# Patient Record
Sex: MALE | Race: WHITE | NOT HISPANIC OR LATINO | Employment: OTHER | URBAN - METROPOLITAN AREA
[De-identification: names, ages, dates, MRNs, and addresses within clinical notes are randomized per-mention and may not be internally consistent; named-entity substitution may affect disease eponyms.]

---

## 2017-01-10 RX ORDER — ATORVASTATIN CALCIUM 10 MG/1
TABLET, FILM COATED ORAL
Qty: 30 TABLET | Refills: 0 | Status: SHIPPED | OUTPATIENT
Start: 2017-01-10 | End: 2017-01-23 | Stop reason: DRUGHIGH

## 2017-01-16 DIAGNOSIS — E11.9 CONTROLLED TYPE 2 DIABETES MELLITUS WITHOUT COMPLICATION, WITHOUT LONG-TERM CURRENT USE OF INSULIN (HCC): ICD-10-CM

## 2017-01-16 DIAGNOSIS — E55.9 VITAMIN D DEFICIENCY: ICD-10-CM

## 2017-01-16 DIAGNOSIS — I10 HTN (HYPERTENSION), BENIGN: Primary | ICD-10-CM

## 2017-01-16 LAB
25(OH)D3+25(OH)D2 SERPL-MCNC: 27 NG/ML
ALBUMIN SERPL-MCNC: 4.1 G/DL (ref 3.5–5.2)
ALBUMIN/GLOB SERPL: 1.8 G/DL
ALP SERPL-CCNC: 131 U/L (ref 40–129)
ALT SERPL-CCNC: 22 U/L (ref 5–41)
AST SERPL-CCNC: 19 U/L (ref 5–40)
BASOPHILS # BLD AUTO: 0.09 10*3/MM3 (ref 0–0.2)
BASOPHILS NFR BLD AUTO: 1 % (ref 0–2)
BILIRUB SERPL-MCNC: 0.4 MG/DL (ref 0.2–1.2)
BUN SERPL-MCNC: 16 MG/DL (ref 8–23)
BUN/CREAT SERPL: 15.7 (ref 7–25)
CALCIUM SERPL-MCNC: 9.1 MG/DL (ref 8.8–10.5)
CHLORIDE SERPL-SCNC: 103 MMOL/L (ref 98–107)
CHOLEST SERPL-MCNC: 160 MG/DL (ref 0–200)
CO2 SERPL-SCNC: 28.8 MMOL/L (ref 22–29)
CREAT SERPL-MCNC: 1.02 MG/DL (ref 0.76–1.27)
EOSINOPHIL # BLD AUTO: 0.3 10*3/MM3 (ref 0.1–0.3)
EOSINOPHIL NFR BLD AUTO: 3.4 % (ref 0–4)
ERYTHROCYTE [DISTWIDTH] IN BLOOD BY AUTOMATED COUNT: 14.1 % (ref 11.5–14.5)
GLOBULIN SER CALC-MCNC: 2.3 GM/DL
GLUCOSE SERPL-MCNC: 105 MG/DL (ref 65–99)
HBA1C MFR BLD: 6.1 % (ref 4.8–5.6)
HCT VFR BLD AUTO: 43.6 % (ref 42–52)
HDLC SERPL-MCNC: 36 MG/DL (ref 40–60)
HGB BLD-MCNC: 13.7 G/DL (ref 14–18)
IMM GRANULOCYTES # BLD: 0.05 10*3/MM3 (ref 0–0.03)
IMM GRANULOCYTES NFR BLD: 0.6 % (ref 0–0.5)
LDLC SERPL CALC-MCNC: 102 MG/DL (ref 0–100)
LYMPHOCYTES # BLD AUTO: 2.87 10*3/MM3 (ref 0.6–4.8)
LYMPHOCYTES NFR BLD AUTO: 32.4 % (ref 20–45)
MCH RBC QN AUTO: 27.8 PG (ref 27–31)
MCHC RBC AUTO-ENTMCNC: 31.4 G/DL (ref 31–37)
MCV RBC AUTO: 88.4 FL (ref 80–94)
MONOCYTES # BLD AUTO: 0.72 10*3/MM3 (ref 0–1)
MONOCYTES NFR BLD AUTO: 8.1 % (ref 3–8)
NEUTROPHILS # BLD AUTO: 4.84 10*3/MM3 (ref 1.5–8.3)
NEUTROPHILS NFR BLD AUTO: 54.5 % (ref 45–70)
NRBC BLD AUTO-RTO: 0 /100 WBC (ref 0–0)
PLATELET # BLD AUTO: 284 10*3/MM3 (ref 140–500)
POTASSIUM SERPL-SCNC: 4.9 MMOL/L (ref 3.5–5.2)
PROT SERPL-MCNC: 6.4 G/DL (ref 6–8.5)
RBC # BLD AUTO: 4.93 10*6/MM3 (ref 4.7–6.1)
SODIUM SERPL-SCNC: 143 MMOL/L (ref 136–145)
TRIGL SERPL-MCNC: 112 MG/DL (ref 0–150)
VLDLC SERPL CALC-MCNC: 22.4 MG/DL (ref 8–32)
WBC # BLD AUTO: 8.87 10*3/MM3 (ref 4.8–10.8)

## 2017-01-23 ENCOUNTER — OFFICE VISIT (OUTPATIENT)
Dept: FAMILY MEDICINE CLINIC | Facility: CLINIC | Age: 67
End: 2017-01-23

## 2017-01-23 VITALS
HEIGHT: 69 IN | WEIGHT: 278 LBS | TEMPERATURE: 98.2 F | BODY MASS INDEX: 41.18 KG/M2 | HEART RATE: 100 BPM | DIASTOLIC BLOOD PRESSURE: 80 MMHG | SYSTOLIC BLOOD PRESSURE: 130 MMHG | OXYGEN SATURATION: 95 %

## 2017-01-23 DIAGNOSIS — E11.9 CONTROLLED TYPE 2 DIABETES MELLITUS WITHOUT COMPLICATION, WITHOUT LONG-TERM CURRENT USE OF INSULIN (HCC): ICD-10-CM

## 2017-01-23 DIAGNOSIS — E55.9 VITAMIN D DEFICIENCY: ICD-10-CM

## 2017-01-23 DIAGNOSIS — E78.5 HYPERLIPIDEMIA, UNSPECIFIED HYPERLIPIDEMIA TYPE: ICD-10-CM

## 2017-01-23 DIAGNOSIS — I10 HTN (HYPERTENSION), BENIGN: Primary | ICD-10-CM

## 2017-01-23 DIAGNOSIS — E66.01 MORBID EXOGENOUS OBESITY (HCC): ICD-10-CM

## 2017-01-23 DIAGNOSIS — Z00.00 HEALTH CARE MAINTENANCE: ICD-10-CM

## 2017-01-23 PROCEDURE — 99213 OFFICE O/P EST LOW 20 MIN: CPT | Performed by: FAMILY MEDICINE

## 2017-01-23 RX ORDER — ATORVASTATIN CALCIUM 20 MG/1
TABLET, FILM COATED ORAL
Refills: 3 | COMMUNITY
Start: 2017-01-19

## 2017-01-23 RX ORDER — FLUOXETINE HYDROCHLORIDE 40 MG/1
40 CAPSULE ORAL DAILY
Qty: 90 CAPSULE | Refills: 1 | Status: SHIPPED | OUTPATIENT
Start: 2017-01-23 | End: 2017-07-05 | Stop reason: SDUPTHER

## 2017-01-23 RX ORDER — BUPROPION HYDROCHLORIDE 300 MG/1
300 TABLET ORAL EVERY MORNING
Qty: 90 TABLET | Refills: 1 | Status: SHIPPED | OUTPATIENT
Start: 2017-01-23 | End: 2017-07-05 | Stop reason: SDUPTHER

## 2017-01-23 RX ORDER — PANTOPRAZOLE SODIUM 40 MG/1
40 TABLET, DELAYED RELEASE ORAL DAILY
Qty: 90 TABLET | Refills: 1 | Status: SHIPPED | OUTPATIENT
Start: 2017-01-23 | End: 2017-09-17 | Stop reason: SDUPTHER

## 2017-01-23 RX ORDER — GABAPENTIN 400 MG/1
400 CAPSULE ORAL 3 TIMES DAILY
COMMUNITY

## 2017-01-23 RX ORDER — ARIPIPRAZOLE 20 MG/1
20 TABLET ORAL DAILY
Qty: 90 TABLET | Refills: 1 | Status: SHIPPED | OUTPATIENT
Start: 2017-01-23 | End: 2017-07-05 | Stop reason: SDUPTHER

## 2017-01-23 NOTE — MR AVS SNAPSHOT
Simba Mosher   1/23/2017 9:00 AM   Office Visit    Provider:  Jose Maria Molina DO   Department:  [facility]   Dept Phone:  [phone]                Your Full Care Plan              Today's Medication Changes          These changes are accurate as of: 1/23/17  9:34 AM.  If you have any questions, ask your nurse or doctor.               Medication(s)that have changed:     atorvastatin 20 MG tablet   Commonly known as:  LIPITOR   TK 1 T PO D   What changed:  Another medication with the same name was removed. Continue taking this medication, and follow the directions you see here.       gabapentin 400 MG capsule   Commonly known as:  NEURONTIN   Take 400 mg by mouth 3 (Three) Times a Day.   What changed:  Another medication with the same name was removed. Continue taking this medication, and follow the directions you see here.            Where to Get Your Medications      These medications were sent to [facility] [address] [place] [address] AT [address] of [address] [facility] [phone] [place] [phone] [address] [address]     Phone:  [phone]     ARIPiprazole 20 MG tablet    buPROPion  MG 24 hr tablet    FLUoxetine 40 MG capsule    metFORMIN 500 MG tablet    pantoprazole 40 MG EC tablet                  Your Updated Medication List          This list is accurate as of: 1/23/17  9:34 AM.  Always use your most recent med list.                ARIPiprazole 20 MG tablet   Commonly known as:  ABILIFY   Take 1 tablet by mouth Daily.       atorvastatin 20 MG tablet   Commonly known as:  LIPITOR       buPROPion  MG 24 hr tablet   Commonly known as:  WELLBUTRIN XL   Take 1 tablet by mouth Every Morning.       FLUoxetine 40 MG capsule   Commonly known as:  PROzac   Take 1 capsule by mouth Daily.       gabapentin 400 MG capsule   Commonly known as:  NEURONTIN       gentamicin 0.3 % ophthalmic  ointment   Commonly known as:  GENTAK   Administer  into the left eye 3 (three) times a day.       glucose blood test strip   Test once daily       HYDROcodone-acetaminophen 7.5-325 MG per tablet   Commonly known as:  NORCO       ketoconazole 2 % cream   Commonly known as:  NIZORAL   Apply  topically daily.       lisinopril 40 MG tablet   Commonly known as:  PRINIVIL,ZESTRIL   TAKE 1 TABLET BY MOUTH EVERY DAY       metFORMIN 500 MG tablet   Commonly known as:  GLUCOPHAGE   Take 1 tablet by mouth 2 (Two) Times a Day With Meals.       pantoprazole 40 MG EC tablet   Commonly known as:  PROTONIX   Take 1 tablet by mouth Daily.       Vitamin D 2000 UNITS tablet               You Were Diagnosed With        Codes Comments    HTN (hypertension), benign    -  Primary ICD-10-CM: I10  ICD-9-CM: 401.1     Vitamin D deficiency     ICD-10-CM: E55.9  ICD-9-CM: 268.9     Controlled type 2 diabetes mellitus without complication, without long-term current use of insulin     ICD-10-CM: E11.9  ICD-9-CM: 250.00     Hyperlipidemia, unspecified hyperlipidemia type     ICD-10-CM: E78.5  ICD-9-CM: 272.4     Health care maintenance     ICD-10-CM: Z00.00  ICD-9-CM: V70.0       Instructions    Avoid cholesterol and sugar in diet.  Start to lower weight with restricting calories and exercising.       Patient Instructions History      [employer] Signup     Our records indicate that you have an active [employer] account.    You can view your After Visit Summary by going to [email or web address] [place] and logging in with your [employer] username and password.  If you don't have a [employer] username and password but a parent or guardian has access to your record, the parent or guardian should login with their own [employer] username and password and access your record to view the After Visit Summary.    If you have questions, you can email [email or web address] or call [phone] to talk to our [employer] staff.  Remember, [employer] is NOT to be  "used for urgent needs.  For medical emergencies, dial 911.               Other Info from Your Visit           Your Appointments     Jan 31, 2017  9:00 AM EST   Consult Sleep Clinic with [facility]EP [facility] PROVIDER SCHEDULE   [facility] ([place])    [address] [place] [address]  [place] [address]   [phone]            Jun 28, 2017  9:00 AM EDT   [facility] with [facility]   [facility] (--)    [address] [facility] [address]   [phone]            Jul 05, 2017  9:00 AM EDT   Follow Up with Jose Maria Molina DO   [facility] (--)    [address] [facility] [address]   [phone]           Arrive 15 minutes prior to appointment.              Allergies     Iodine      Shellfish-derived Products        Reason for Visit     Hypertension lab follow up      Vital Signs     Blood Pressure Pulse Temperature Height Weight Oxygen Saturation    130/80 100 98.2 °F (36.8 °C) 69\" (175.3 cm) 278 lb (126 kg) 95%    Body Mass Index Smoking Status                41.05 kg/m2 Former Smoker          Problems and Diagnoses Noted     HTN (hypertension), benign    Type 2 diabetes mellitus, controlled    Vitamin D deficiency    High cholesterol or triglycerides        Health maintenance examination          "

## 2017-01-23 NOTE — PATIENT INSTRUCTIONS
Avoid cholesterol and sugar in diet.  Start to lower weight with restricting calories and exercising.

## 2017-01-24 PROBLEM — E78.5 HYPERLIPIDEMIA: Status: ACTIVE | Noted: 2017-01-24

## 2017-01-24 PROBLEM — E66.01 MORBID EXOGENOUS OBESITY (HCC): Status: ACTIVE | Noted: 2017-01-24

## 2017-01-31 ENCOUNTER — HOSPITAL ENCOUNTER (OUTPATIENT)
Dept: SLEEP MEDICINE | Facility: HOSPITAL | Age: 67
Discharge: HOME | End: 2017-01-31
Admitting: INTERNAL MEDICINE

## 2017-01-31 DIAGNOSIS — G47.33 OBSTRUCTIVE SLEEP APNEA: ICD-10-CM

## 2017-01-31 DIAGNOSIS — G47.33 OSA (OBSTRUCTIVE SLEEP APNEA): Primary | ICD-10-CM

## 2017-01-31 PROCEDURE — G0463 HOSPITAL OUTPT CLINIC VISIT: HCPCS

## 2017-02-02 ENCOUNTER — TELEPHONE (OUTPATIENT)
Dept: FAMILY MEDICINE CLINIC | Facility: CLINIC | Age: 67
End: 2017-02-02

## 2017-02-02 RX ORDER — AZITHROMYCIN 250 MG/1
TABLET, FILM COATED ORAL
Qty: 6 TABLET | Refills: 0 | Status: SHIPPED | OUTPATIENT
Start: 2017-02-02 | End: 2017-02-27

## 2017-02-02 NOTE — TELEPHONE ENCOUNTER
Pt calling with productive cough, nasal drainage,and burning in the nose.  Wants rx unless you need to see him.

## 2017-02-03 NOTE — TELEPHONE ENCOUNTER
Patient has been notified of medication.  He states he saw his sleep study  On Monday, and he has suggested that he have a lung study because of his COPD.

## 2017-02-06 DIAGNOSIS — J42 CHRONIC BRONCHITIS, UNSPECIFIED CHRONIC BRONCHITIS TYPE (HCC): Primary | ICD-10-CM

## 2017-02-14 ENCOUNTER — HOSPITAL ENCOUNTER (OUTPATIENT)
Dept: SLEEP MEDICINE | Facility: HOSPITAL | Age: 67
Discharge: HOME | End: 2017-02-14
Admitting: INTERNAL MEDICINE

## 2017-02-14 DIAGNOSIS — G47.33 OSA (OBSTRUCTIVE SLEEP APNEA): ICD-10-CM

## 2017-02-14 PROCEDURE — 95811 POLYSOM 6/>YRS CPAP 4/> PARM: CPT

## 2017-02-15 ENCOUNTER — HOSPITAL ENCOUNTER (OUTPATIENT)
Dept: PULMONOLOGY | Facility: HOSPITAL | Age: 67
Discharge: HOME | End: 2017-02-15
Admitting: FAMILY MEDICINE

## 2017-02-15 VITALS — OXYGEN SATURATION: 98 % | HEART RATE: 87 BPM

## 2017-02-15 DIAGNOSIS — J42 CHRONIC BRONCHITIS, UNSPECIFIED CHRONIC BRONCHITIS TYPE (HCC): ICD-10-CM

## 2017-02-15 PROCEDURE — 94729 DIFFUSING CAPACITY: CPT

## 2017-02-15 PROCEDURE — 63710000001 ALBUTEROL PER 1 MG: Performed by: FAMILY MEDICINE

## 2017-02-15 PROCEDURE — 94726 PLETHYSMOGRAPHY LUNG VOLUMES: CPT

## 2017-02-15 PROCEDURE — 94060 EVALUATION OF WHEEZING: CPT

## 2017-02-15 PROCEDURE — A9270 NON-COVERED ITEM OR SERVICE: HCPCS | Performed by: FAMILY MEDICINE

## 2017-02-15 RX ORDER — ALBUTEROL SULFATE 2.5 MG/3ML
2.5 SOLUTION RESPIRATORY (INHALATION) ONCE
Status: COMPLETED | OUTPATIENT
Start: 2017-02-15 | End: 2017-02-15

## 2017-02-15 RX ADMIN — ALBUTEROL SULFATE 2.5 MG: 2.5 SOLUTION RESPIRATORY (INHALATION) at 08:27

## 2017-02-24 RX ORDER — LISINOPRIL 40 MG/1
TABLET ORAL
Qty: 90 TABLET | Refills: 0 | Status: SHIPPED | OUTPATIENT
Start: 2017-02-24 | End: 2017-05-13 | Stop reason: SDUPTHER

## 2017-02-27 ENCOUNTER — OFFICE VISIT (OUTPATIENT)
Dept: FAMILY MEDICINE CLINIC | Facility: CLINIC | Age: 67
End: 2017-02-27

## 2017-02-27 VITALS
RESPIRATION RATE: 22 BRPM | HEART RATE: 86 BPM | TEMPERATURE: 97.7 F | DIASTOLIC BLOOD PRESSURE: 74 MMHG | OXYGEN SATURATION: 97 % | HEIGHT: 69 IN | WEIGHT: 289 LBS | SYSTOLIC BLOOD PRESSURE: 142 MMHG | BODY MASS INDEX: 42.8 KG/M2

## 2017-02-27 DIAGNOSIS — N45.1 EPIDIDYMITIS: Primary | ICD-10-CM

## 2017-02-27 PROCEDURE — 99214 OFFICE O/P EST MOD 30 MIN: CPT | Performed by: NURSE PRACTITIONER

## 2017-02-27 RX ORDER — LEVOFLOXACIN 500 MG/1
500 TABLET, FILM COATED ORAL DAILY
Qty: 10 TABLET | Refills: 0 | Status: SHIPPED | OUTPATIENT
Start: 2017-02-27 | End: 2017-03-09

## 2017-02-27 NOTE — PROGRESS NOTES
Subjective   Simba Mosher is a 66 y.o. male.   Chief Complaint   Patient presents with   • Hernia     x 5-6 days, right testicle, feels alot of pull. pt stataes it cause alot of pain when pt has to get on knees, pulling down.     Vitals:    02/27/17 0904   BP: 142/74   Pulse: 86   Resp: 22   Temp: 97.7 °F (36.5 °C)   SpO2: 97%     Allergies   Allergen Reactions   • Iodine    • Shellfish-Derived Products      HPI Comments: Aching pain on right side.   Worse when standing and moving.   Radiates to lower back. Has had two hydroceles before.   Taking aleve at night.   Can feel a bulge.     Groin Pain   The patient's primary symptoms include scrotal swelling (right side) and testicular pain (right-achy). The patient's pertinent negatives include no genital injury, genital itching, penile discharge or penile pain. This is a new problem. The current episode started in the past 7 days (last tuesday). The problem has been gradually worsening. Associated symptoms include abdominal pain (lower/pelvic) and urgency. Pertinent negatives include no anorexia, chills, constipation, diarrhea, dysuria, fever, flank pain, frequency, headaches or nausea. Associated symptoms comments: Spastic bowel. . There is no reported injury. The testicular pain affects the right testicle. There is swelling in the right testicle. The color of the testicles is normal. The symptoms are aggravated by activity and tactile pressure (lifting grand daughter). No, his partner does not have an STD. His past medical history is significant for kidney stones. There is no history of BPH or cryptorchidism. (Hydrocele)        The following portions of the patient's history were reviewed and updated as appropriate: allergies, current medications, past family history, past medical history, past social history, past surgical history and problem list.    Review of Systems   Constitutional: Negative for chills and fever.   Gastrointestinal: Positive for abdominal  pain (lower/pelvic). Negative for anorexia, constipation, diarrhea and nausea.   Genitourinary: Positive for scrotal swelling (right side), testicular pain (right-achy) and urgency. Negative for difficulty urinating, discharge, dysuria, flank pain, frequency and penile pain.   Musculoskeletal: Positive for back pain (low back).   Neurological: Negative for headaches.   All other systems reviewed and are negative.      Objective   Physical Exam   Constitutional: He appears well-nourished.   HENT:   Head: Normocephalic.   Cardiovascular: Normal rate.    Pulmonary/Chest: Effort normal.   Abdominal: Hernia confirmed negative in the right inguinal area.   Genitourinary: Penis normal. Right testis shows swelling and tenderness. Left testis shows no swelling and no tenderness.   Musculoskeletal: Normal range of motion.   Lymphadenopathy: No inguinal adenopathy noted on the right side.   Skin: Skin is warm and dry.   Psychiatric: He has a normal mood and affect. His behavior is normal. Judgment and thought content normal.   Nursing note and vitals reviewed.      Assessment/Plan   Problems Addressed this Visit     None      Visit Diagnoses     Epididymitis    -  Primary    Relevant Medications    levoFLOXacin (LEVAQUIN) 500 MG tablet    Other Relevant Orders    US scrotum and testicles

## 2017-03-04 ENCOUNTER — APPOINTMENT (OUTPATIENT)
Dept: CT IMAGING | Facility: HOSPITAL | Age: 67
End: 2017-03-04

## 2017-03-04 ENCOUNTER — HOSPITAL ENCOUNTER (EMERGENCY)
Facility: HOSPITAL | Age: 67
Discharge: HOME | End: 2017-03-04
Attending: EMERGENCY MEDICINE | Admitting: EMERGENCY MEDICINE

## 2017-03-04 ENCOUNTER — TELEPHONE (OUTPATIENT)
Dept: FAMILY MEDICINE CLINIC | Facility: CLINIC | Age: 67
End: 2017-03-04

## 2017-03-04 VITALS
RESPIRATION RATE: 20 BRPM | SYSTOLIC BLOOD PRESSURE: 144 MMHG | TEMPERATURE: 97.4 F | HEART RATE: 76 BPM | DIASTOLIC BLOOD PRESSURE: 84 MMHG | OXYGEN SATURATION: 95 % | WEIGHT: 285 LBS | HEIGHT: 69 IN | BODY MASS INDEX: 42.21 KG/M2

## 2017-03-04 DIAGNOSIS — N50.811 PAIN IN RIGHT TESTICLE: Primary | ICD-10-CM

## 2017-03-04 LAB
ALBUMIN SERPL-MCNC: 3.7 G/DL (ref 3.5–5.2)
ALBUMIN/GLOB SERPL: 1.2 G/DL
ALP SERPL-CCNC: 121 U/L (ref 40–129)
ALT SERPL W P-5'-P-CCNC: 23 U/L (ref 5–41)
ANION GAP SERPL CALCULATED.3IONS-SCNC: 11.5 MMOL/L
AST SERPL-CCNC: 23 U/L (ref 5–40)
BASOPHILS # BLD AUTO: 0.09 10*3/MM3 (ref 0–0.2)
BASOPHILS NFR BLD AUTO: 1 % (ref 0–2)
BILIRUB SERPL-MCNC: 0.3 MG/DL (ref 0.2–1.2)
BILIRUB UR QL STRIP: NEGATIVE
BUN BLD-MCNC: 19 MG/DL (ref 8–23)
BUN/CREAT SERPL: 19 (ref 7–25)
CALCIUM SPEC-SCNC: 9 MG/DL (ref 8.8–10.5)
CHLORIDE SERPL-SCNC: 103 MMOL/L (ref 98–107)
CLARITY UR: CLEAR
CO2 SERPL-SCNC: 25.5 MMOL/L (ref 22–29)
COLOR UR: YELLOW
CREAT BLD-MCNC: 1 MG/DL (ref 0.76–1.27)
DEPRECATED RDW RBC AUTO: 43.5 FL (ref 37–54)
EOSINOPHIL # BLD AUTO: 0.32 10*3/MM3 (ref 0.1–0.3)
EOSINOPHIL NFR BLD AUTO: 3.5 % (ref 0–4)
ERYTHROCYTE [DISTWIDTH] IN BLOOD BY AUTOMATED COUNT: 14.2 % (ref 11.5–14.5)
GFR SERPL CREATININE-BSD FRML MDRD: 75 ML/MIN/1.73
GLOBULIN UR ELPH-MCNC: 3 GM/DL
GLUCOSE BLD-MCNC: 106 MG/DL (ref 65–99)
GLUCOSE UR STRIP-MCNC: NEGATIVE MG/DL
HCT VFR BLD AUTO: 39.5 % (ref 42–52)
HGB BLD-MCNC: 13 G/DL (ref 14–18)
HGB UR QL STRIP.AUTO: NEGATIVE
IMM GRANULOCYTES # BLD: 0.07 10*3/MM3 (ref 0–0.03)
IMM GRANULOCYTES NFR BLD: 0.8 % (ref 0–0.5)
KETONES UR QL STRIP: NEGATIVE
LEUKOCYTE ESTERASE UR QL STRIP.AUTO: NEGATIVE
LYMPHOCYTES # BLD AUTO: 3.37 10*3/MM3 (ref 0.6–4.8)
LYMPHOCYTES NFR BLD AUTO: 37.3 % (ref 20–45)
MCH RBC QN AUTO: 27.9 PG (ref 27–31)
MCHC RBC AUTO-ENTMCNC: 32.9 G/DL (ref 31–37)
MCV RBC AUTO: 84.8 FL (ref 80–94)
MONOCYTES # BLD AUTO: 0.72 10*3/MM3 (ref 0–1)
MONOCYTES NFR BLD AUTO: 8 % (ref 3–8)
NEUTROPHILS # BLD AUTO: 4.47 10*3/MM3 (ref 1.5–8.3)
NEUTROPHILS NFR BLD AUTO: 49.4 % (ref 45–70)
NITRITE UR QL STRIP: NEGATIVE
NRBC BLD MANUAL-RTO: 0 /100 WBC (ref 0–0)
PH UR STRIP.AUTO: 6 [PH] (ref 4.5–8)
PLATELET # BLD AUTO: 235 10*3/MM3 (ref 140–500)
PMV BLD AUTO: 10.1 FL (ref 7.4–10.4)
POTASSIUM BLD-SCNC: 4 MMOL/L (ref 3.5–5.2)
PROT SERPL-MCNC: 6.7 G/DL (ref 6–8.5)
PROT UR QL STRIP: NEGATIVE
RBC # BLD AUTO: 4.66 10*6/MM3 (ref 4.7–6.1)
SODIUM BLD-SCNC: 140 MMOL/L (ref 136–145)
SP GR UR STRIP: 1.02 (ref 1–1.03)
UROBILINOGEN UR QL STRIP: ABNORMAL
WBC NRBC COR # BLD: 9.04 10*3/MM3 (ref 4.8–10.8)

## 2017-03-04 PROCEDURE — 99283 EMERGENCY DEPT VISIT LOW MDM: CPT

## 2017-03-04 PROCEDURE — 25010000002 MORPHINE PER 10 MG: Performed by: EMERGENCY MEDICINE

## 2017-03-04 PROCEDURE — 81003 URINALYSIS AUTO W/O SCOPE: CPT | Performed by: EMERGENCY MEDICINE

## 2017-03-04 PROCEDURE — 74176 CT ABD & PELVIS W/O CONTRAST: CPT

## 2017-03-04 PROCEDURE — 85025 COMPLETE CBC W/AUTO DIFF WBC: CPT | Performed by: EMERGENCY MEDICINE

## 2017-03-04 PROCEDURE — 96375 TX/PRO/DX INJ NEW DRUG ADDON: CPT

## 2017-03-04 PROCEDURE — 96361 HYDRATE IV INFUSION ADD-ON: CPT

## 2017-03-04 PROCEDURE — 99284 EMERGENCY DEPT VISIT MOD MDM: CPT | Performed by: EMERGENCY MEDICINE

## 2017-03-04 PROCEDURE — 25010000002 ONDANSETRON PER 1 MG: Performed by: EMERGENCY MEDICINE

## 2017-03-04 PROCEDURE — 96374 THER/PROPH/DIAG INJ IV PUSH: CPT

## 2017-03-04 PROCEDURE — 80053 COMPREHEN METABOLIC PANEL: CPT | Performed by: EMERGENCY MEDICINE

## 2017-03-04 RX ORDER — ONDANSETRON 2 MG/ML
4 INJECTION INTRAMUSCULAR; INTRAVENOUS ONCE
Status: COMPLETED | OUTPATIENT
Start: 2017-03-04 | End: 2017-03-04

## 2017-03-04 RX ORDER — TRAMADOL HYDROCHLORIDE 50 MG/1
50 TABLET ORAL EVERY 8 HOURS PRN
Qty: 15 TABLET | Refills: 0 | Status: SHIPPED | OUTPATIENT
Start: 2017-03-04 | End: 2017-03-09

## 2017-03-04 RX ORDER — SODIUM CHLORIDE 0.9 % (FLUSH) 0.9 %
10 SYRINGE (ML) INJECTION AS NEEDED
Status: DISCONTINUED | OUTPATIENT
Start: 2017-03-04 | End: 2017-03-04 | Stop reason: HOSPADM

## 2017-03-04 RX ADMIN — MORPHINE SULFATE 4 MG: 4 INJECTION, SOLUTION INTRAMUSCULAR; INTRAVENOUS at 19:04

## 2017-03-04 RX ADMIN — ONDANSETRON 4 MG: 2 INJECTION, SOLUTION INTRAMUSCULAR; INTRAVENOUS at 19:02

## 2017-03-04 RX ADMIN — SODIUM CHLORIDE 1000 ML: 9 INJECTION, SOLUTION INTRAVENOUS at 19:00

## 2017-03-05 NOTE — DISCHARGE INSTRUCTIONS
Continue taking your antibiotics as previously directed until they're completed.  Drink plenty of fluids as tolerated.  As we discussed today, I am unable to perform an ultrasound of your testicle at this facility this evening.  I recommend that you should have an ultrasound done as soon as possible and you are welcome to transfer to [facility] or any other nearby facility with ultrasound availability for further studies to better understand your diagnosis.  If you're choosing not to do that, however, then I recommend that you should schedule your previously ordered outpatient testicle ultrasound as soon as possible this week.  I also recommend that you should follow-up with a urologist for further management of your symptoms once the ultrasound is completed.  Please return to the emergency room immediately if your pain suddenly worsens or if you develop any fevers, nausea, vomiting, rashes, wounds, difficulties urinating or any other concerns.

## 2017-03-05 NOTE — ED PROVIDER NOTES
Subjective   History of Present Illness  History of Present Illness    Chief complaint: Testicle pain    Location: Right testicle, lower abdomen and groin    Quality/Severity:  Moderate pain    Timing/Duration: Ongoing for 5 or 6 days    Modifying Factors: None    Narrative: Patient presents today for evaluation of worsening right testicle pain that is radiating up into the right abdomen and groin area.  He says the pain began about 6 days ago.  He went to see his primary care doctor earlier this week and apparently they started him on Levaquin therapy for presumed infection.  He tells me that they did not do a urinalysis at that time.  He also tells me that he has an order for an outpatient ultrasound of the testicle to be done soon.  He denies any fevers.  He denies any blood in his urine.  He denies any bowel changes.  He denies any open sores or lesions to the testicle or groin area.  He tells me that he did have surgery for bilateral hydroceles many years ago and he also had a urology surgery for a complicated kidney stone approximately 14 years ago when he was in [place].  He does not currently follow with any urology providers.    Associated Symptoms: As above    Review of Systems   Constitutional: Negative for activity change, appetite change, chills and fever.   HENT: Negative.  Negative for congestion and drooling.    Eyes: Negative for pain and visual disturbance.   Respiratory: Negative for cough and shortness of breath.    Cardiovascular: Negative for chest pain.   Gastrointestinal: Positive for abdominal pain and nausea. Negative for constipation, diarrhea and vomiting.   Genitourinary: Positive for flank pain, scrotal swelling and testicular pain. Negative for decreased urine volume, difficulty urinating, discharge, dysuria, frequency, genital sores, hematuria, penile pain, penile swelling and urgency.   Musculoskeletal: Negative for joint swelling and neck pain.   Skin: Negative for color change, rash  and wound.   Neurological: Negative for syncope and headaches.   All other systems reviewed and are negative.      Past Medical History   Diagnosis Date   • Diabetes mellitus    • Hypertension        Allergies   Allergen Reactions   • Iodine    • Shellfish-Derived Products        Past Surgical History   Procedure Laterality Date   • Tonsillectomy     • Hydrocele excision / repair Bilateral    • Eye surgery Left    • Cervical spine surgery         Family History   Problem Relation Age of Onset   • Cancer Mother      melanoma   • Migraines Mother    • Lupus Sister        Social History     Social History   • Marital status:      Spouse name: N/A   • Number of children: N/A   • Years of education: N/A     Social History Main Topics   • Smoking status: Former Smoker   • Smokeless tobacco: None   • Alcohol use No   • Drug use: None   • Sexual activity: Not Asked     Other Topics Concern   • None     Social History Narrative       ED Triage Vitals   Temp Heart Rate Resp BP SpO2   03/04/17 1823 03/04/17 1823 03/04/17 1823 03/04/17 1823 03/04/17 1823   97.4 °F (36.3 °C) 80 20 156/94 96 %      Temp src Heart Rate Source Patient Position BP Location FiO2 (%)   03/04/17 1823 03/04/17 1823 03/04/17 1823 03/04/17 1823 --   Oral Monitor Sitting Right arm          Objective   Physical Exam   Constitutional: He is oriented to person, place, and time. He appears well-developed and well-nourished. He appears distressed.   HENT:   Head: Normocephalic and atraumatic.   Eyes: EOM are normal. Pupils are equal, round, and reactive to light. Right eye exhibits no discharge. Left eye exhibits no discharge.   Neck: Normal range of motion. Neck supple.   Cardiovascular: Normal rate, regular rhythm, normal heart sounds and intact distal pulses.  Exam reveals no gallop.    No murmur heard.  Pulmonary/Chest: Effort normal. No respiratory distress. He has no wheezes. He exhibits no tenderness.   Abdominal: Soft. He exhibits no mass. There  is no tenderness. There is no rebound and no guarding. No hernia.   Obese abdomen.  There is really no significant right lower quadrant or suprapubic tenderness evident on deep palpation.  No inguinal hernia is readily evident with deep palpation either.  Exam is somewhat limited by his obesity.   Genitourinary: Penis normal. No penile tenderness.   Genitourinary Comments: The left testicle is normal in contour and nontender to palpation.  The right testicle is normal in contour but slightly enlarged with some swelling and diffusely tender to palpation even lightly.  Cremasteric reflex does appear to be intact.  There is no tenderness to the penis.  The glans appears normal with a normal meatus and no signs of discharge or inflammation.  Are no external lesions or rashes evident throughout the entire genitals or perineum   Musculoskeletal: Normal range of motion. He exhibits no edema or deformity.   Neurological: He is alert and oriented to person, place, and time. No cranial nerve deficit.   Skin: Skin is warm and dry. No rash noted. He is not diaphoretic. No erythema.   Psychiatric: He has a normal mood and affect. His behavior is normal. Judgment and thought content normal.   Nursing note and vitals reviewed.    Results for orders placed or performed during the hospital encounter of 03/04/17   Comprehensive Metabolic Panel   Result Value Ref Range    Glucose 106 (H) 65 - 99 mg/dL    BUN 19 8 - 23 mg/dL    Creatinine 1.00 0.76 - 1.27 mg/dL    Sodium 140 136 - 145 mmol/L    Potassium 4.0 3.5 - 5.2 mmol/L    Chloride 103 98 - 107 mmol/L    CO2 25.5 22.0 - 29.0 mmol/L    Calcium 9.0 8.8 - 10.5 mg/dL    Total Protein 6.7 6.0 - 8.5 g/dL    Albumin 3.70 3.50 - 5.20 g/dL    ALT (SGPT) 23 5 - 41 U/L    AST (SGOT) 23 5 - 40 U/L    Alkaline Phosphatase 121 40 - 129 U/L    Total Bilirubin 0.3 0.2 - 1.2 mg/dL    eGFR Non African Amer 75 >60 mL/min/1.73    Globulin 3.0 gm/dL    A/G Ratio 1.2 g/dL    BUN/Creatinine Ratio 19.0  7.0 - 25.0    Anion Gap 11.5 mmol/L   Urinalysis With / Culture If Indicated   Result Value Ref Range    Color, UA Yellow Yellow, Straw    Appearance, UA Clear Clear    pH, UA 6.0 4.5 - 8.0    Specific Gravity, UA 1.025 1.003 - 1.030    Glucose, UA Negative Negative    Ketones, UA Negative Negative, 80 mg/dL (3+), >=160 mg/dL (4+)    Bilirubin, UA Negative Negative    Blood, UA Negative Negative    Protein, UA Negative Negative    Leuk Esterase, UA Negative Negative    Nitrite, UA Negative Negative    Urobilinogen, UA 2.0 E.U./dL (A) 0.2 - 1.0 E.U./dL   CBC Auto Differential   Result Value Ref Range    WBC 9.04 4.80 - 10.80 10*3/mm3    RBC 4.66 (L) 4.70 - 6.10 10*6/mm3    Hemoglobin 13.0 (L) 14.0 - 18.0 g/dL    Hematocrit 39.5 (L) 42.0 - 52.0 %    MCV 84.8 80.0 - 94.0 fL    MCH 27.9 27.0 - 31.0 pg    MCHC 32.9 31.0 - 37.0 g/dL    RDW 14.2 11.5 - 14.5 %    RDW-SD 43.5 37.0 - 54.0 fl    MPV 10.1 7.4 - 10.4 fL    Platelets 235 140 - 500 10*3/mm3    Neutrophil % 49.4 45.0 - 70.0 %    Lymphocyte % 37.3 20.0 - 45.0 %    Monocyte % 8.0 3.0 - 8.0 %    Eosinophil % 3.5 0.0 - 4.0 %    Basophil % 1.0 0.0 - 2.0 %    Immature Grans % 0.8 (H) 0.0 - 0.5 %    Neutrophils, Absolute 4.47 1.50 - 8.30 10*3/mm3    Lymphocytes, Absolute 3.37 0.60 - 4.80 10*3/mm3    Monocytes, Absolute 0.72 0.00 - 1.00 10*3/mm3    Eosinophils, Absolute 0.32 (H) 0.10 - 0.30 10*3/mm3    Basophils, Absolute 0.09 0.00 - 0.20 10*3/mm3    Immature Grans, Absolute 0.07 (H) 0.00 - 0.03 10*3/mm3    nRBC 0.0 0.0 - 0.0 /100 WBC       RADIOLOGY        Study: CT abdomen and pelvis without contrast    Findings: No acute findings.  Nonobstructing left renal stone.  Bilateral renal cysts.  Normal appendix.  2 cm left adrenal nodule.    Interpreted Contemporaneously by Dr. Angel, independently viewed by me          Procedures         ED Course  ED Course   Comment By Time   I reviewed the labs and CT report which are all entirely reassuring.  Physical exam does not  suggest any acute urologic emergency, however I am somewhat limited without the availability of ultrasonography to rule out completely any vascular emergency such as torsion.  I had a lengthy discussion with the patient regarding this decision point.  His pain has been present for one week and does not seem to be necessarily changing.  I explained to him that I would be willing and able to transfer him to [facility] for further studies if he desires that and I also explained that without an ultrasound I'm unable to tell him for sure what's going on.  Based on his clinical findings, I suspect it may be an orchitis or hydrocele causing his pain.  He expresses to me that he does not wish to go to [facility] but says that he will try to reschedule his outpatient ultrasound for earlier this week.  When his primary care provider gave him the ultrasound order several days ago, he initially scheduled it for next Friday because he said that he didn't have time throughout the week to get it done any earlier.  I explained to him that the ultrasound is going to be very important to get that as soon as possible for further understanding of this diagnosis.  He agreed to do so ASAP.  I also gave him information about following up with urology service for further management as needed.  Finally I encouraged him to return to the ED immediately if he has worsening symptoms or if he changes his mind about anything. Guy Kurtz MD 03/04 2006                  MDM  Number of Diagnoses or Management Options  Pain in right testicle:      Amount and/or Complexity of Data Reviewed  Clinical lab tests: ordered and reviewed  Tests in the radiology section of CPT®: ordered and reviewed    Risk of Complications, Morbidity, and/or Mortality  Presenting problems: moderate  Diagnostic procedures: moderate  Management options: moderate        Final diagnoses:   Pain in right testicle            Guy Kurtz MD  03/04/17 2008

## 2017-03-06 NOTE — TELEPHONE ENCOUNTER
I spoke with the on Saturday, March 4, 2017 approximately 5:21 PM.  States he was seen earlier in the week by nurse practitioner, Martina Robert.  They thought he had a hernia vs epididymidis.  He is scheduled for ultrasound.  Prescribed Levaquin antibiotic.  Today he has had increase abdominal pain.  Rates the pain as 7 out of the 10.  He has been taking naproxen which was helping but not anymore.  States this pain is along the right testicle that radiates to his back.  He has a known history of kidney stones but denied any hematuria, dysuria, urine frequency, urgency,nausea,vomiting,diarrhea, fevers or chills. I have advised him to go to the ED for further eval/treatment.  He voiced understanding.

## 2017-03-08 ENCOUNTER — APPOINTMENT (OUTPATIENT)
Dept: ULTRASOUND IMAGING | Facility: HOSPITAL | Age: 67
End: 2017-03-08

## 2017-03-08 ENCOUNTER — TELEPHONE (OUTPATIENT)
Dept: SLEEP MEDICINE | Facility: HOSPITAL | Age: 67
End: 2017-03-08

## 2017-03-08 NOTE — TELEPHONE ENCOUNTER
Spoke with patient.  Gave test results AHI 92.4.  DME of choice is [facility].  Pt will call when set up to schedule follow up appointment between 31-90 days for a compliance check per insurance requirements.

## 2017-03-10 ENCOUNTER — HOSPITAL ENCOUNTER (OUTPATIENT)
Dept: ULTRASOUND IMAGING | Facility: HOSPITAL | Age: 67
Discharge: HOME | End: 2017-03-10
Admitting: NURSE PRACTITIONER

## 2017-03-10 ENCOUNTER — HOSPITAL ENCOUNTER (OUTPATIENT)
Dept: ULTRASOUND IMAGING | Facility: HOSPITAL | Age: 67
Discharge: HOME | End: 2017-03-10

## 2017-03-10 DIAGNOSIS — N45.1 EPIDIDYMITIS: ICD-10-CM

## 2017-03-10 PROCEDURE — 76870 US EXAM SCROTUM: CPT

## 2017-03-10 PROCEDURE — 93975 VASCULAR STUDY: CPT

## 2017-04-18 ENCOUNTER — OFFICE VISIT (OUTPATIENT)
Dept: FAMILY MEDICINE CLINIC | Facility: CLINIC | Age: 67
End: 2017-04-18

## 2017-04-18 ENCOUNTER — HOSPITAL ENCOUNTER (OUTPATIENT)
Dept: GENERAL RADIOLOGY | Facility: HOSPITAL | Age: 67
Discharge: HOME | End: 2017-04-18
Admitting: NURSE PRACTITIONER

## 2017-04-18 VITALS
WEIGHT: 291 LBS | DIASTOLIC BLOOD PRESSURE: 96 MMHG | SYSTOLIC BLOOD PRESSURE: 162 MMHG | RESPIRATION RATE: 18 BRPM | OXYGEN SATURATION: 95 % | HEIGHT: 69 IN | HEART RATE: 58 BPM | BODY MASS INDEX: 43.1 KG/M2

## 2017-04-18 DIAGNOSIS — M25.561 ACUTE PAIN OF RIGHT KNEE: ICD-10-CM

## 2017-04-18 DIAGNOSIS — R60.9 WATER RETENTION: Primary | ICD-10-CM

## 2017-04-18 PROCEDURE — 73562 X-RAY EXAM OF KNEE 3: CPT

## 2017-04-18 PROCEDURE — 99212 OFFICE O/P EST SF 10 MIN: CPT | Performed by: NURSE PRACTITIONER

## 2017-04-18 RX ORDER — CELECOXIB 200 MG/1
200 CAPSULE ORAL DAILY
Qty: 30 CAPSULE | Refills: 3 | Status: SHIPPED | OUTPATIENT
Start: 2017-04-18

## 2017-04-18 RX ORDER — FUROSEMIDE 80 MG
80 TABLET ORAL DAILY PRN
Qty: 30 TABLET | Refills: 3 | Status: SHIPPED | OUTPATIENT
Start: 2017-04-18

## 2017-04-18 NOTE — PROGRESS NOTES
Subjective   Simba Mosher is a 66 y.o. male.   Chief Complaint   Patient presents with   • Knee Pain     x 1 month. sharp pain on side of knee, muscle ache goes across top of leg, pops ROM causes popping. painful. swelling.      Vitals:    04/18/17 1357   BP: 162/96   Pulse: 58   Resp: 18   SpO2: 95%     Allergies   Allergen Reactions   • Iodine    • Shellfish-Derived Products        HPI Comments: Aches across anterior top knee. Hurts worse getting off the floor. Its generally ok to walk.   Calves are spasming into thigh. Happens worse with laying down.       Knee Pain    Incident onset: worsening in the last 6 weeks. There was no injury mechanism. The pain is present in the right knee. The quality of the pain is described as aching. The pain is at a severity of 8/10 (while getting up). The pain has been worsening since onset. Associated symptoms include a loss of motion and muscle weakness (achey). Pertinent negatives include no inability to bear weight, numbness or tingling. He reports no foreign bodies present. He has tried NSAIDs, heat and ice for the symptoms. The treatment provided no relief.        The following portions of the patient's history were reviewed and updated as appropriate: allergies, current medications, past family history, past medical history, past social history, past surgical history and problem list.    Review of Systems   Constitutional: Negative for chills, diaphoresis, fatigue and fever.   Musculoskeletal: Positive for arthralgias (knees) and joint swelling (knees).   Neurological: Negative for tingling and numbness.   All other systems reviewed and are negative.      Objective   Physical Exam   Constitutional: He appears well-developed and well-nourished.   Cardiovascular: Normal rate.    Pulmonary/Chest: Effort normal.   Musculoskeletal: He exhibits edema and tenderness.        Right knee: He exhibits decreased range of motion (with flexion past 90 degrees) and swelling. He  exhibits no effusion, no deformity, no laceration, no erythema, no LCL laxity, no bony tenderness and normal meniscus. Tenderness found. Medial joint line tenderness noted.   Skin: Skin is warm and dry.   Psychiatric: He has a normal mood and affect. His behavior is normal. Judgment and thought content normal.   Nursing note and vitals reviewed.      Assessment/Plan   Problems Addressed this Visit     None      Visit Diagnoses     Water retention    -  Primary    Relevant Medications    furosemide (LASIX) 80 MG tablet    Acute pain of right knee        Relevant Medications    celecoxib (CELEBREX) 200 MG capsule    Other Relevant Orders    XR Knee 3 View Right

## 2017-04-25 ENCOUNTER — TELEPHONE (OUTPATIENT)
Dept: FAMILY MEDICINE CLINIC | Facility: CLINIC | Age: 67
End: 2017-04-25

## 2017-04-25 RX ORDER — POTASSIUM CHLORIDE 750 MG/1
TABLET, FILM COATED, EXTENDED RELEASE ORAL
Qty: 30 TABLET | Refills: 5 | Status: SHIPPED | OUTPATIENT
Start: 2017-04-25

## 2017-05-15 RX ORDER — LISINOPRIL 40 MG/1
TABLET ORAL
Qty: 90 TABLET | Refills: 0 | Status: SHIPPED | OUTPATIENT
Start: 2017-05-15

## 2017-05-16 ENCOUNTER — HOSPITAL ENCOUNTER (OUTPATIENT)
Dept: SLEEP MEDICINE | Facility: HOSPITAL | Age: 67
Discharge: HOME | End: 2017-05-16
Admitting: INTERNAL MEDICINE

## 2017-05-16 PROCEDURE — G0463 HOSPITAL OUTPT CLINIC VISIT: HCPCS

## 2017-06-23 ENCOUNTER — RESULTS ENCOUNTER (OUTPATIENT)
Dept: FAMILY MEDICINE CLINIC | Facility: CLINIC | Age: 67
End: 2017-06-23

## 2017-06-23 DIAGNOSIS — I10 HTN (HYPERTENSION), BENIGN: ICD-10-CM

## 2017-06-23 DIAGNOSIS — E55.9 VITAMIN D DEFICIENCY: ICD-10-CM

## 2017-06-23 DIAGNOSIS — E78.5 HYPERLIPIDEMIA, UNSPECIFIED HYPERLIPIDEMIA TYPE: ICD-10-CM

## 2017-06-23 DIAGNOSIS — E11.9 CONTROLLED TYPE 2 DIABETES MELLITUS WITHOUT COMPLICATION, WITHOUT LONG-TERM CURRENT USE OF INSULIN (HCC): ICD-10-CM

## 2017-06-23 DIAGNOSIS — Z00.00 HEALTH CARE MAINTENANCE: ICD-10-CM

## 2017-06-28 LAB
25(OH)D3+25(OH)D2 SERPL-MCNC: 32.3 NG/ML
ALBUMIN SERPL-MCNC: 3.9 G/DL (ref 3.5–5.2)
ALBUMIN/GLOB SERPL: 1.8 G/DL
ALP SERPL-CCNC: 106 U/L (ref 40–129)
ALT SERPL-CCNC: 19 U/L (ref 5–41)
AST SERPL-CCNC: 16 U/L (ref 5–40)
BASOPHILS # BLD AUTO: 0.08 10*3/MM3 (ref 0–0.2)
BASOPHILS NFR BLD AUTO: 1 % (ref 0–2)
BILIRUB SERPL-MCNC: 0.3 MG/DL (ref 0.2–1.2)
BUN SERPL-MCNC: 15 MG/DL (ref 8–23)
BUN/CREAT SERPL: 15.3 (ref 7–25)
CALCIUM SERPL-MCNC: 8.8 MG/DL (ref 8.8–10.5)
CHLORIDE SERPL-SCNC: 100 MMOL/L (ref 98–107)
CHOLEST SERPL-MCNC: 116 MG/DL (ref 0–200)
CO2 SERPL-SCNC: 26.1 MMOL/L (ref 22–29)
CREAT SERPL-MCNC: 0.98 MG/DL (ref 0.76–1.27)
EOSINOPHIL # BLD AUTO: 0.18 10*3/MM3 (ref 0.1–0.3)
EOSINOPHIL NFR BLD AUTO: 2.3 % (ref 0–4)
ERYTHROCYTE [DISTWIDTH] IN BLOOD BY AUTOMATED COUNT: 14.2 % (ref 11.5–14.5)
GLOBULIN SER CALC-MCNC: 2.2 GM/DL
GLUCOSE SERPL-MCNC: 74 MG/DL (ref 65–99)
HBA1C MFR BLD: 5.9 % (ref 4.8–5.6)
HCT VFR BLD AUTO: 39.6 % (ref 42–52)
HDLC SERPL-MCNC: 34 MG/DL (ref 40–60)
HGB BLD-MCNC: 12.7 G/DL (ref 14–18)
IMM GRANULOCYTES # BLD: 0.06 10*3/MM3 (ref 0–0.03)
IMM GRANULOCYTES NFR BLD: 0.8 % (ref 0–0.5)
LDLC SERPL CALC-MCNC: 67 MG/DL (ref 0–100)
LYMPHOCYTES # BLD AUTO: 2.49 10*3/MM3 (ref 0.6–4.8)
LYMPHOCYTES NFR BLD AUTO: 31.4 % (ref 20–45)
MCH RBC QN AUTO: 27.4 PG (ref 27–31)
MCHC RBC AUTO-ENTMCNC: 32.1 G/DL (ref 31–37)
MCV RBC AUTO: 85.3 FL (ref 80–94)
MONOCYTES # BLD AUTO: 0.61 10*3/MM3 (ref 0–1)
MONOCYTES NFR BLD AUTO: 7.7 % (ref 3–8)
NEUTROPHILS # BLD AUTO: 4.52 10*3/MM3 (ref 1.5–8.3)
NEUTROPHILS NFR BLD AUTO: 56.8 % (ref 45–70)
NRBC BLD AUTO-RTO: 0 /100 WBC (ref 0–0)
PLATELET # BLD AUTO: 239 10*3/MM3 (ref 140–500)
POTASSIUM SERPL-SCNC: 4.5 MMOL/L (ref 3.5–5.2)
PROT SERPL-MCNC: 6.1 G/DL (ref 6–8.5)
PSA SERPL-MCNC: 1.23 NG/ML (ref 0–4)
RBC # BLD AUTO: 4.64 10*6/MM3 (ref 4.7–6.1)
SODIUM SERPL-SCNC: 138 MMOL/L (ref 136–145)
TRIGL SERPL-MCNC: 76 MG/DL (ref 0–150)
VLDLC SERPL CALC-MCNC: 15.2 MG/DL (ref 8–32)
WBC # BLD AUTO: 7.94 10*3/MM3 (ref 4.8–10.8)

## 2017-07-05 ENCOUNTER — OFFICE VISIT (OUTPATIENT)
Dept: FAMILY MEDICINE CLINIC | Facility: CLINIC | Age: 67
End: 2017-07-05

## 2017-07-05 VITALS
BODY MASS INDEX: 41.77 KG/M2 | OXYGEN SATURATION: 98 % | TEMPERATURE: 98.7 F | HEART RATE: 82 BPM | SYSTOLIC BLOOD PRESSURE: 132 MMHG | WEIGHT: 282 LBS | DIASTOLIC BLOOD PRESSURE: 80 MMHG | HEIGHT: 69 IN

## 2017-07-05 DIAGNOSIS — E78.5 HYPERLIPIDEMIA, UNSPECIFIED HYPERLIPIDEMIA TYPE: ICD-10-CM

## 2017-07-05 DIAGNOSIS — E55.9 VITAMIN D DEFICIENCY: ICD-10-CM

## 2017-07-05 DIAGNOSIS — I25.10 CORONARY ARTERY DISEASE INVOLVING NATIVE CORONARY ARTERY OF NATIVE HEART WITHOUT ANGINA PECTORIS: ICD-10-CM

## 2017-07-05 DIAGNOSIS — E11.9 CONTROLLED TYPE 2 DIABETES MELLITUS WITHOUT COMPLICATION, WITHOUT LONG-TERM CURRENT USE OF INSULIN (HCC): ICD-10-CM

## 2017-07-05 DIAGNOSIS — I10 HTN (HYPERTENSION), BENIGN: Primary | ICD-10-CM

## 2017-07-05 DIAGNOSIS — E66.01 MORBID EXOGENOUS OBESITY (HCC): ICD-10-CM

## 2017-07-05 DIAGNOSIS — M25.561 RIGHT KNEE PAIN, UNSPECIFIED CHRONICITY: ICD-10-CM

## 2017-07-05 PROCEDURE — 99213 OFFICE O/P EST LOW 20 MIN: CPT | Performed by: FAMILY MEDICINE

## 2017-07-05 RX ORDER — ARIPIPRAZOLE 20 MG/1
20 TABLET ORAL DAILY
Qty: 90 TABLET | Refills: 1 | Status: SHIPPED | OUTPATIENT
Start: 2017-07-05

## 2017-07-05 RX ORDER — BUPROPION HYDROCHLORIDE 300 MG/1
300 TABLET ORAL EVERY MORNING
Qty: 90 TABLET | Refills: 1 | Status: SHIPPED | OUTPATIENT
Start: 2017-07-05

## 2017-07-05 RX ORDER — FLUOXETINE HYDROCHLORIDE 40 MG/1
40 CAPSULE ORAL DAILY
Qty: 90 CAPSULE | Refills: 1 | Status: SHIPPED | OUTPATIENT
Start: 2017-07-05

## 2017-07-05 NOTE — PROGRESS NOTES
Subjective   Simba Mosher is a 66 y.o. male with   Chief Complaint   Patient presents with   • Hypertension     follow up labs   • Knee Pain     right knee   .    History of Present Illness     Shaan is a 66 yr old white male that presents today for follow up of HTN, HLD, Type II Diabetes, and Vitamin D Deficiency.  Currently Shaan has been using Lisinopril to control HTN, Metformin to control diabetes, and Atorvastatin to control cholesterol. Shaan states that he has been working hard to lower weight and to be more healthy.      Shaan states that he has been having a lot of pain in his right knee.  He has been using Celebrex with some relief.  He has seen Martina REYNA.  He has had xrays completed prior to todays this visit.      The following portions of the patient's history were reviewed and updated as appropriate: allergies, current medications, past family history, past medical history, past social history, past surgical history and problem list.    Review of Systems   Constitutional:        Morbidly obese   Cardiovascular:        HTN  HLD   CAD   Endocrine:        Type II Diabetes  Vit. D Def.     Musculoskeletal:        Right knee pain       Objective     Vitals:    07/05/17 0835   BP: 132/80   Pulse: 82   Temp: 98.7 °F (37.1 °C)   SpO2: 98%     BP Readings from Last 3 Encounters:   07/05/17 132/80   04/18/17 162/96   03/04/17 144/84      Wt Readings from Last 3 Encounters:   07/05/17 282 lb (128 kg)   04/18/17 291 lb (132 kg)   03/04/17 285 lb (129 kg)      Results Encounter on 06/23/2017   Component Date Value Ref Range Status   • WBC 06/28/2017 7.94  4.80 - 10.80 10*3/mm3 Final   • RBC 06/28/2017 4.64* 4.70 - 6.10 10*6/mm3 Final   • Hemoglobin 06/28/2017 12.7* 14.0 - 18.0 g/dL Final   • Hematocrit 06/28/2017 39.6* 42.0 - 52.0 % Final   • MCV 06/28/2017 85.3  80.0 - 94.0 fL Final   • MCH 06/28/2017 27.4  27.0 - 31.0 pg Final   • MCHC 06/28/2017 32.1  31.0 - 37.0 g/dL Final   • RDW 06/28/2017 14.2   11.5 - 14.5 % Final   • Platelets 06/28/2017 239  140 - 500 10*3/mm3 Final   • Neutrophil Rel % 06/28/2017 56.8  45.0 - 70.0 % Final   • Lymphocyte Rel % 06/28/2017 31.4  20.0 - 45.0 % Final   • Monocyte Rel % 06/28/2017 7.7  3.0 - 8.0 % Final   • Eosinophil Rel % 06/28/2017 2.3  0.0 - 4.0 % Final   • Basophil Rel % 06/28/2017 1.0  0.0 - 2.0 % Final   • Neutrophils Absolute 06/28/2017 4.52  1.50 - 8.30 10*3/mm3 Final   • Lymphocytes Absolute 06/28/2017 2.49  0.60 - 4.80 10*3/mm3 Final   • Monocytes Absolute 06/28/2017 0.61  0.00 - 1.00 10*3/mm3 Final   • Eosinophils Absolute 06/28/2017 0.18  0.10 - 0.30 10*3/mm3 Final   • Basophils Absolute 06/28/2017 0.08  0.00 - 0.20 10*3/mm3 Final   • Immature Granulocyte Rel % 06/28/2017 0.8* 0.0 - 0.5 % Final   • Immature Grans Absolute 06/28/2017 0.06* 0.00 - 0.03 10*3/mm3 Final   • nRBC 06/28/2017 0.0  0.0 - 0.0 /100 WBC Final   • Glucose 06/28/2017 74  65 - 99 mg/dL Final   • BUN 06/28/2017 15  8 - 23 mg/dL Final   • Creatinine 06/28/2017 0.98  0.76 - 1.27 mg/dL Final   • eGFR Non  Am 06/28/2017 77  >60 mL/min/1.73 Final   • eGFR African Am 06/28/2017 93  >60 mL/min/1.73 Final   • BUN/Creatinine Ratio 06/28/2017 15.3  7.0 - 25.0 Final   • Sodium 06/28/2017 138  136 - 145 mmol/L Final   • Potassium 06/28/2017 4.5  3.5 - 5.2 mmol/L Final   • Chloride 06/28/2017 100  98 - 107 mmol/L Final   • Total CO2 06/28/2017 26.1  22.0 - 29.0 mmol/L Final   • Calcium 06/28/2017 8.8  8.8 - 10.5 mg/dL Final   • Total Protein 06/28/2017 6.1  6.0 - 8.5 g/dL Final   • Albumin 06/28/2017 3.90  3.50 - 5.20 g/dL Final   • Globulin 06/28/2017 2.2  gm/dL Final   • A/G Ratio 06/28/2017 1.8  g/dL Final   • Total Bilirubin 06/28/2017 0.3  0.2 - 1.2 mg/dL Final   • Alkaline Phosphatase 06/28/2017 106  40 - 129 U/L Final   • AST (SGOT) 06/28/2017 16  5 - 40 U/L Final   • ALT (SGPT) 06/28/2017 19  5 - 41 U/L Final   • Hemoglobin A1C 06/28/2017 5.90* 4.80 - 5.60 % Final   • Total Cholesterol  06/28/2017 116  0 - 200 mg/dL Final   • Triglycerides 06/28/2017 76  0 - 150 mg/dL Final   • HDL Cholesterol 06/28/2017 34* 40 - 60 mg/dL Final   • VLDL Cholesterol 06/28/2017 15.2  8 - 32 mg/dL Final   • LDL Cholesterol  06/28/2017 67  0 - 100 mg/dL Final   • PSA 06/28/2017 1.230  0.000 - 4.000 ng/mL Final    Comment: The total PSA (tPSA) method performed at [facility] is a  quantitative electrochemiluminescence immunoassay 'ECLIA'     • 25 Hydroxy, Vitamin D 06/28/2017 32.3  ng/mL Final    Comment: Reference Range for Total Vitamin D 25(OH)  Deficiency    <20.0 ng/mL  Insufficiency 21-29 ng/mL  Sufficiency   30-74 ng/mL       The above results have been reviewed and explained to the patient.  The patient has been provided a copy    Physical Exam   Constitutional: He is oriented to person, place, and time. He appears well-developed and well-nourished.   HENT:   Head: Normocephalic and atraumatic.   Neck: Trachea normal and phonation normal. Neck supple. Normal carotid pulses present. Carotid bruit is not present. No thyroid mass and no thyromegaly present.   Cardiovascular: Normal rate, regular rhythm and normal heart sounds.  Exam reveals no gallop and no friction rub.    No murmur heard.  Pulmonary/Chest: Effort normal and breath sounds normal. No respiratory distress. He has no decreased breath sounds. He has no wheezes. He has no rhonchi. He has no rales.   Lymphadenopathy:     He has no cervical adenopathy.   Neurological: He is alert and oriented to person, place, and time.   Skin: Skin is warm and dry. No rash noted.   Psychiatric: He has a normal mood and affect. His speech is normal and behavior is normal. Judgment and thought content normal. Cognition and memory are normal.   Nursing note and vitals reviewed.      Assessment/Plan   Simba was seen today for hypertension and knee pain.    Diagnoses and all orders for this visit:    HTN (hypertension), benign  -     CBC & Differential; Future  -     Comprehensive  Metabolic Panel; Future  -     Hemoglobin A1c; Future  -     Lipid Panel; Future    Hyperlipidemia, unspecified hyperlipidemia type  -     CBC & Differential; Future  -     Comprehensive Metabolic Panel; Future  -     Hemoglobin A1c; Future  -     Lipid Panel; Future    Controlled type 2 diabetes mellitus without complication, without long-term current use of insulin  -     CBC & Differential; Future  -     Comprehensive Metabolic Panel; Future  -     Hemoglobin A1c; Future    Vitamin D deficiency  -     CBC & Differential; Future  -     Comprehensive Metabolic Panel; Future  -     Vitamin D 25 Hydroxy; Future    Coronary artery disease involving native coronary artery of native heart without angina pectoris  -     CBC & Differential; Future  -     Comprehensive Metabolic Panel; Future  -     Hemoglobin A1c; Future  -     Lipid Panel; Future    Right knee pain, unspecified chronicity  -     Ambulatory Referral to Orthopedic Surgery    Morbid exogenous obesity    Other orders  -     ARIPiprazole (ABILIFY) 20 MG tablet; Take 1 tablet by mouth Daily.  -     buPROPion XL (WELLBUTRIN XL) 300 MG 24 hr tablet; Take 1 tablet by mouth Every Morning.  -     FLUoxetine (PROzac) 40 MG capsule; Take 1 capsule by mouth Daily.  -     metFORMIN (GLUCOPHAGE) 500 MG tablet; Take 1 tablet by mouth 2 (Two) Times a Day With Meals.        Return in about 6 months (around 1/5/2018).        Scribed for Jose Maria Molina MD by Ambrocio Doty. 07/05/2017    I, Jose Maria Molina MD personally performed the services described in this documentation, as scribed by Ambrocio Doty in my presence, and it is both accurate and complete

## 2017-07-11 ENCOUNTER — DOCUMENTATION (OUTPATIENT)
Dept: PULMONOLOGY | Facility: HOSPITAL | Age: 67
End: 2017-07-11

## 2017-07-11 ENCOUNTER — HOSPITAL ENCOUNTER (OUTPATIENT)
Dept: SLEEP MEDICINE | Facility: HOSPITAL | Age: 67
Discharge: HOME | End: 2017-07-11
Admitting: INTERNAL MEDICINE

## 2017-07-11 PROCEDURE — G0463 HOSPITAL OUTPT CLINIC VISIT: HCPCS

## 2017-07-11 RX ORDER — BLOOD-GLUCOSE METER
1 KIT MISCELLANEOUS DAILY
Qty: 1 EACH | Refills: 0 | Status: SHIPPED | OUTPATIENT
Start: 2017-07-11

## 2017-07-11 NOTE — PROGRESS NOTES
[facility]  Telephone: [phone] / Fax: [phone] [place]  Telephone: [phone] / Fax: [phone] Lauren Whitney    Referring Physician: Jose Maria Molina  PCP: Jose Maria Molina DO    Reason for visit: F/u on hypersomina    The patient was last seen at the sleep disorder Center on 5/16/17.  Last visit CPAP pressures were increased to 15 cm of water to help treat residual hypersomnia that he has been experiencing.  He notices improvement in daytime sleepiness on higher pressures.  He denies choking, gasping respirations.  He wakes up feeling rested in the morning.  He changes CPAP supplies in regular intervals.  He uses fullface mask that fits well.  His DME is [facility].  His [place] sleepiness score is 9 today    He has mild chronic obstructive pulmonary disease.  He has occasional shortness of breath only.  He is currently not using any inhalers.    She denies any interval changes except recent diagnosis of osteoarthritis of the knee.    Caffeine usage: coffee 2 /day, tea 2 a day  Alcohol usage: none  [place] Sleepiness Scale is recorded as 9.    Current Medications:    Current Outpatient Prescriptions:   •  ARIPiprazole (ABILIFY) 20 MG tablet, Take 1 tablet by mouth Daily., Disp: 90 tablet, Rfl: 1  •  atorvastatin (LIPITOR) 20 MG tablet, TK 1 T PO D, Disp: , Rfl: 3  •  buPROPion XL (WELLBUTRIN XL) 300 MG 24 hr tablet, Take 1 tablet by mouth Every Morning., Disp: 90 tablet, Rfl: 1  •  celecoxib (CELEBREX) 200 MG capsule, Take 1 capsule by mouth Daily., Disp: 30 capsule, Rfl: 3  •  Cholecalciferol (VITAMIN D) 2000 UNITS tablet, Take 2,000 Units by mouth., Disp: , Rfl:   •  FLUoxetine (PROzac) 40 MG capsule, Take 1 capsule by mouth Daily., Disp: 90 capsule, Rfl: 1  •  furosemide (LASIX) 80 MG tablet, Take 1 tablet by mouth Daily As Needed (water retention)., Disp: 30 tablet, Rfl: 3  •  gabapentin (NEURONTIN) 400 MG capsule, Take 400 mg by mouth 3 (Three) Times a Day.,  Disp: , Rfl:   •  glucose blood test strip, Check blood sugar once daily  Patient will need a one touch ultra glucometer also., Disp: 100 each, Rfl: 1  •  HYDROcodone-acetaminophen (NORCO) 7.5-325 MG per tablet, TK 1 T PO  Q 6 H PRN, Disp: , Rfl: 0  •  ketoconazole (NIZORAL) 2 % cream, Apply  topically daily., Disp: 30 g, Rfl: 2  •  lisinopril (PRINIVIL,ZESTRIL) 40 MG tablet, TAKE 1 TABLET BY MOUTH EVERY DAY, Disp: 90 tablet, Rfl: 0  •  metFORMIN (GLUCOPHAGE) 500 MG tablet, Take 1 tablet by mouth 2 (Two) Times a Day With Meals., Disp: 160 tablet, Rfl: 1  •  pantoprazole (PROTONIX) 40 MG EC tablet, Take 1 tablet by mouth Daily., Disp: 90 tablet, Rfl: 1  •  potassium chloride (K-DUR) 10 MEQ CR tablet, 1 po qd, Disp: 30 tablet, Rfl: 5    I have reviewed the Past Medical History, Past Surgical History, Social History and Family History.    Review of Systems   and as recorded in Sleep Questionnaire.    Vital Signs: Height 69, weight 284 pounds, blood pressure 151/91 BMI: 42            General: Alert. Cooperative. Well developed. No acute distress.             Head:  Normocephalic. Symmetrical. Atraumatic.              Eyes: Sclera clear. No icterus. PERRLA. Normal EOM.             Ears: No deformities. Normal hearing.             Nose: No septal deviation. No drainage.          Throat: No oral lesions. No thrush. Moist mucous membranes.   Tongue is large       Pharynx: Posterior pharyngeal pillars are narrow with a Mallampati score of IV (only hard palate visible)    and pharynx is moist   Chest Wall:  No abnormalities observed.             Neck:  Trachea midline. No JVD.          Lungs:  Clear to auscultation bilaterally. No wheezes. No rhonchi. No rales. Respirations regular, even and unlabored.            Heart:  Regular rhythm and normal rate. Normal S1 and S2. No murmur.     Abdomen:  Soft, non-tender and non-distended. Normal bowel sounds. No masses. No organomegaly. No guarding. No rebound  tenderness.  Extremities:  Moves all extremities well. No cyanosis. No redness. No edema.           Pulses: Pulses palpable and equal bilaterally.               Skin: Dry. Intact. No bleeding. No rash.           Neuro: Moves all 4 extremities and cranial nerves grossly intact.  Psychiatric: Normal mood and affect.    Testing:  Download date 06/11/2017- 07/10/2017 shows 76% compliance with average use of 5 hours and 32 minutes on CPAP pressures of 15 cm of water with residual AHI of 6.8, CAindex 0.7.     [facility]: [facility]    Impression:  · Obstructive sleep apnea-severe with AHI 92  · Hypersomnia-resolved  · Morbid obesity-weight loss strongly advised  · Mild COPD-no exacerbation  · Diabetes mellitus  · Hypertension       Plan:  Due to elevated apnea hypopnea index I have increased pressures today to 16 cm of water.  Patient feels that he can tolerate slight increase in pressures.  I asked him to contact us if he feels that the pressures are excessive so that we reduce them.  He was asked to continue and change CPAP accessories in regular intervals for optimal benefit.  He uses the CPAP device in regular intervals and benefits from it. He was reminded to change CPAP supplies regularly: change cushion on the mask or plugs on nasal pillows along with disposable filters once every month and change of mask frame, tubing, headgear and Velcro straps every 6 months at the minimum was reiterated.       There is  no evidence of COPD exacerbation on exam. He does not require maintenance inhalers.    He will he will follow with Dr. Aubrey Little [facility] in 6 months.    Thank you for allowing me to participate in your patient's care.    PAUL Andujar

## 2017-08-18 ENCOUNTER — OFFICE VISIT (OUTPATIENT)
Dept: ORTHOPEDIC SURGERY | Facility: CLINIC | Age: 67
End: 2017-08-18

## 2017-08-18 VITALS
DIASTOLIC BLOOD PRESSURE: 113 MMHG | WEIGHT: 285 LBS | HEIGHT: 69 IN | BODY MASS INDEX: 42.21 KG/M2 | SYSTOLIC BLOOD PRESSURE: 163 MMHG | HEART RATE: 75 BPM

## 2017-08-18 DIAGNOSIS — M17.11 PRIMARY OSTEOARTHRITIS OF RIGHT KNEE: Primary | ICD-10-CM

## 2017-08-18 PROCEDURE — 99203 OFFICE O/P NEW LOW 30 MIN: CPT | Performed by: ORTHOPAEDIC SURGERY

## 2017-08-18 PROCEDURE — 20610 DRAIN/INJ JOINT/BURSA W/O US: CPT | Performed by: ORTHOPAEDIC SURGERY

## 2017-08-18 RX ADMIN — LIDOCAINE HYDROCHLORIDE 4 ML: 10 INJECTION, SOLUTION EPIDURAL; INFILTRATION; INTRACAUDAL; PERINEURAL at 11:06

## 2017-08-18 RX ADMIN — BETAMETHASONE SODIUM PHOSPHATE AND BETAMETHASONE ACETATE 12 MG: 3; 3 INJECTION, SUSPENSION INTRA-ARTICULAR; INTRALESIONAL; INTRAMUSCULAR; SOFT TISSUE at 11:06

## 2017-08-18 RX ADMIN — BUPIVACAINE HYDROCHLORIDE 4 ML: 5 INJECTION, SOLUTION EPIDURAL; INTRACAUDAL at 11:06

## 2017-08-18 NOTE — PROGRESS NOTES
Subjective:     Patient ID: Simba Mosher is a 67 y.o. male.    Chief Complaint:  Right knee pain  History of Present Illness  Simba Mosher presents to clinic today for evaluation of right knee pain, has noted be worse over the last 4-6 months, initially injured his knee back in the 1980s while playing softball.  Localizes majority the pain is anterior medial aspects of his right knee, exacerbated with prolonged weightbearing, walking, rotational activities as well as deep flexion.  Rates pain as a 4 out of 10 and describes it as aching in nature.  Denies any significant radiation of pain into his lower extremity.  Has noted moderate improvement with use of Celebrex.  Does note associated crepitus as well as intermittent swelling does wax and wane.  Denies any balwinder locking, catching, or giving way of the knee.  Denies any right hip or groin pain.  Denies any associated numbness or tingling right lower extremity.     Social History     Occupational History   • Not on file.     Social History Main Topics   • Smoking status: Former Smoker   • Smokeless tobacco: Not on file   • Alcohol use No   • Drug use: Not on file   • Sexual activity: Not on file      Past Medical History:   Diagnosis Date   • Cervical disc disorder    • Diabetes mellitus    • Fracture of wrist    • Hypertension      Past Surgical History:   Procedure Laterality Date   • CERVICAL SPINE SURGERY     • EYE SURGERY Left    • HYDROCELE EXCISION / REPAIR Bilateral    • TONSILLECTOMY         Family History   Problem Relation Age of Onset   • Cancer Mother      melanoma   • Migraines Mother    • Lupus Sister          Review of Systems   Constitutional: Negative for chills, diaphoresis, fever and unexpected weight change.   HENT: Negative for hearing loss, nosebleeds, sore throat and tinnitus.    Eyes: Negative for pain and visual disturbance.   Respiratory: Negative for cough, shortness of breath and wheezing.    Cardiovascular: Negative for chest  "pain and palpitations.   Gastrointestinal: Negative for abdominal pain, diarrhea, nausea and vomiting.   Endocrine: Negative for cold intolerance, heat intolerance and polydipsia.   Genitourinary: Negative for difficulty urinating, dysuria and hematuria.   Musculoskeletal: Positive for joint swelling. Negative for arthralgias and myalgias.   Skin: Negative for rash and wound.   Allergic/Immunologic: Negative for environmental allergies.   Neurological: Negative for dizziness, syncope and numbness.   Hematological: Does not bruise/bleed easily.   Psychiatric/Behavioral: Negative for dysphoric mood and sleep disturbance. The patient is nervous/anxious.            Objective:  Vitals:    08/18/17 1042   BP: (!) 163/113   Pulse: 75   Weight: 285 lb (129 kg)   Height: 69\" (175.3 cm)     Last 2 weights    08/18/17  1042   Weight: 285 lb (129 kg)     Body mass index is 42.09 kg/(m^2).  Physical Exam    Vital signs reviewed.   General: No acute distress.  Eyes: conjunctiva clear; pupils equally round and reactive  ENT: external ears and nose atraumatic; oropharynx clear  CV: no peripheral edema  Resp: normal respiratory effort  Skin: no rashes or wounds; normal turgor  Psych: mood and affect appropriate; recent and remote memory intact       Ortho Exam    Right knee-active range of motion 3-125°, 4+ out of 5 strength on flexion and extension, moderate effusion noted.  Maximal tenderness palpation along medial joint line, moderate tenderness along medial patellar facet, positive active patellar compression test, negative patellar apprehension test, slight lateral patellar tracking.  No inverted J sign.  Grade 1A Lachman, negative anterior posterior drawer, stable to varus and valgus stress at 3 and 30°.  No right hip pain on logroll or Stinchfield exam, no pain on straight leg raise.  Brisk cap refill all digits, 2+ dorsalis is pulse right foot.  Bilateral lower extremity edema and redness noted consistent with venous stasis " changes.  Imaging:  Review of three-view x-rays right knee as well as radiology report from outside facility dated April 18, 2017 indicates moderate medial compartment joint space narrowing with slight lateral patella position on axial image, no evidence of intra-articular radiopaque loose body, no significant osteophyte changes noted.    Assessment:       1. Primary osteoarthritis of right knee          Plan:  JOSE R query complete.          Discussed treatment options at length with patient at today's visit. Reviewed findings from x-ray consistent with degenerative change to the right knee.  Patient does have known risk factors for injection including diabetes but would like to proceed with intra-articular injection at this time given overall failure with prescription anti-inflammatory medications, activity modification, and home exercises.  He will continue work on home exercises for strengthening, weight loss with dieting and activity.    Simba Mosher was in agreement with plan and had all questions answered.     Orders:  Orders Placed This Encounter   Procedures   • Large Joint Arthrocentesis       Medications:  No orders of the defined types were placed in this encounter.      Followup:  No Follow-up on file.          Dragon transcription disclaimer     Much of this encounter note is an electronic transcription/translation of spoken language to printed text. The electronic translation of spoken language may permit erroneous, or at times, nonsensical words or phrases to be inadvertently transcribed. Although I have reviewed the note for such errors, some may still exist.  Large Joint Arthrocentesis  Date/Time: 8/18/2017 11:06 AM  Consent given by: patient  Site marked: site marked  Timeout: Immediately prior to procedure a time out was called to verify the correct patient, procedure, equipment, support staff and site/side marked as required   Supporting Documentation  Indications: pain   Procedure  Details  Location: knee - R knee  Preparation: Patient was prepped and draped in the usual sterile fashion  Needle size: 22 G  Approach: superior  Medications administered: 4 mL lidocaine PF 1% 1 %; 4 mL bupivacaine (PF) 0.5 %; 12 mg betamethasone acetate-betamethasone sodium phosphate 6 (3-3) MG/ML  Patient tolerance: patient tolerated the procedure well with no immediate complications

## 2017-08-28 RX ORDER — BETAMETHASONE SODIUM PHOSPHATE AND BETAMETHASONE ACETATE 3; 3 MG/ML; MG/ML
12 INJECTION, SUSPENSION INTRA-ARTICULAR; INTRALESIONAL; INTRAMUSCULAR; SOFT TISSUE
Status: COMPLETED | OUTPATIENT
Start: 2017-08-18 | End: 2017-08-18

## 2017-08-28 RX ORDER — LIDOCAINE HYDROCHLORIDE 10 MG/ML
4 INJECTION, SOLUTION EPIDURAL; INFILTRATION; INTRACAUDAL; PERINEURAL
Status: COMPLETED | OUTPATIENT
Start: 2017-08-18 | End: 2017-08-18

## 2017-08-28 RX ORDER — BUPIVACAINE HYDROCHLORIDE 5 MG/ML
4 INJECTION, SOLUTION EPIDURAL; INTRACAUDAL
Status: COMPLETED | OUTPATIENT
Start: 2017-08-18 | End: 2017-08-18

## 2017-09-15 ENCOUNTER — OFFICE VISIT (OUTPATIENT)
Dept: ORTHOPEDIC SURGERY | Facility: CLINIC | Age: 67
End: 2017-09-15

## 2017-09-15 DIAGNOSIS — M17.11 PRIMARY OSTEOARTHRITIS OF RIGHT KNEE: Primary | ICD-10-CM

## 2017-09-15 PROCEDURE — 99213 OFFICE O/P EST LOW 20 MIN: CPT | Performed by: ORTHOPAEDIC SURGERY

## 2017-09-15 RX ORDER — TIZANIDINE 4 MG/1
TABLET ORAL
COMMUNITY
Start: 2017-09-01

## 2017-09-15 NOTE — PROGRESS NOTES
Subjective:     Patient ID: Simba Mosher is a 67 y.o. male.    Chief Complaint:  Follow-up right knee pain, DJD  History of Present Illness  Simba Mosher returns to clinic today for evaluation of right knee, states for the first week at 100% relief of his pain with the previous injection 4 weeks ago, however over the following 3 weeks she's had progressive recurrence of pain now to baseline levels, rates as a 5 out of 10, aching in nature, localized primarily to the anterior and medial aspects of his right knee, exacerbated with prolonged weightbearing, walking, deep flexion activities and stair climbing.  Minimal improvement with rest and use of Celebrex.  Moderate associated swelling that does wax and wane.  Denies any radiation of pain from the knee.       Social History     Occupational History   • Not on file.     Social History Main Topics   • Smoking status: Former Smoker   • Smokeless tobacco: Not on file   • Alcohol use No   • Drug use: Not on file   • Sexual activity: Not on file      Past Medical History:   Diagnosis Date   • Cervical disc disorder    • Diabetes mellitus    • Fracture of wrist    • Hypertension      Past Surgical History:   Procedure Laterality Date   • CERVICAL SPINE SURGERY     • EYE SURGERY Left    • HYDROCELE EXCISION / REPAIR Bilateral    • TONSILLECTOMY         Family History   Problem Relation Age of Onset   • Cancer Mother      melanoma   • Migraines Mother    • Lupus Sister          Review of Systems   Constitutional: Negative for chills, diaphoresis, fever and unexpected weight change.   HENT: Negative for hearing loss, nosebleeds, sore throat and tinnitus.    Eyes: Negative for pain and visual disturbance.   Respiratory: Negative for cough, shortness of breath and wheezing.    Cardiovascular: Negative for chest pain and palpitations.   Gastrointestinal: Negative for abdominal pain, diarrhea, nausea and vomiting.   Endocrine: Negative for cold intolerance, heat  intolerance and polydipsia.   Genitourinary: Negative for difficulty urinating, dysuria and hematuria.   Musculoskeletal: Positive for joint swelling and myalgias. Negative for arthralgias.   Skin: Negative for rash and wound.   Allergic/Immunologic: Negative for environmental allergies.   Neurological: Negative for dizziness, syncope and numbness.   Hematological: Does not bruise/bleed easily.   Psychiatric/Behavioral: Negative for dysphoric mood and sleep disturbance. The patient is not nervous/anxious.    All other systems reviewed and are negative.          Objective:  There were no vitals filed for this visit.  There were no vitals filed for this visit.  There is no height or weight on file to calculate BMI.  General: No acute distress.  Resp: normal respiratory effort  Skin: no rashes or wounds; normal turgor  Psych: mood and affect appropriate; recent and remote memory intact         Ortho Exam    Right knee-active range of motion 2-125°, 4+ out of 5 strength on flexion and extension, moderate effusion noted.  Maximal tenderness palpation along medial joint line, moderate tenderness along medial lateral patellar facet with positive active patellar compression test.  Stable to varus and valgus stress at 0 and 30°.    Imaging:    Assessment:       1. Primary osteoarthritis of right knee          Plan:  JOSE R query complete.          Discussed treatment options at length with patient at today's visit. Given early failure of intra-articular cortisone injection, reviewed other options with patient, he has decided today that he would like to proceed with viscous supplement injection series.  Orders placed for medication and approval through insurance.  We will contact patient once approved and set up injection visits.    Simba Mosher was in agreement with plan and had all questions answered.     Orders:  Orders Placed This Encounter   Procedures   • Visco Treatment       Medications:  No orders of the defined  types were placed in this encounter.      Followup:  No Follow-up on file.          Dragon transcription disclaimer     Much of this encounter note is an electronic transcription/translation of spoken language to printed text. The electronic translation of spoken language may permit erroneous, or at times, nonsensical words or phrases to be inadvertently transcribed. Although I have reviewed the note for such errors, some may still exist.

## 2017-09-18 RX ORDER — PANTOPRAZOLE SODIUM 40 MG/1
TABLET, DELAYED RELEASE ORAL
Qty: 90 TABLET | Refills: 0 | Status: SHIPPED | OUTPATIENT
Start: 2017-09-18

## 2017-09-22 ENCOUNTER — CLINICAL SUPPORT (OUTPATIENT)
Dept: ORTHOPEDIC SURGERY | Facility: CLINIC | Age: 67
End: 2017-09-22

## 2017-09-22 VITALS — BODY MASS INDEX: 41.47 KG/M2 | WEIGHT: 280 LBS | HEIGHT: 69 IN

## 2017-09-22 DIAGNOSIS — M17.11 PRIMARY OSTEOARTHRITIS OF RIGHT KNEE: ICD-10-CM

## 2017-09-22 PROCEDURE — 20610 DRAIN/INJ JOINT/BURSA W/O US: CPT | Performed by: NURSE PRACTITIONER

## 2017-09-22 RX ORDER — DESONIDE 0.5 MG/G
CREAM TOPICAL
COMMUNITY
Start: 2017-09-21

## 2017-09-22 NOTE — PROGRESS NOTES
Procedure   Large Joint Arthrocentesis  Date/Time: 9/22/2017 10:36 AM  Consent given by: patient  Site marked: site marked  Timeout: Immediately prior to procedure a time out was called to verify the correct patient, procedure, equipment, support staff and site/side marked as required   Supporting Documentation  Indications: pain   Procedure Details  Location: knee - R knee  Preparation: Patient was prepped and draped in the usual sterile fashion  Needle size: 22 G  Approach: superior  Medications administered: 30 mg Hyaluronan 30 MG/2ML  Patient tolerance: patient tolerated the procedure well with no immediate complications          Patient presented today for viscosupplement injection.  This is the first injection for the right knee.  We will see patient back in one week.  We reviewed risks benefits and alternatives of the procedure and patient wished to proceed today and had all questions answered.

## 2017-09-29 ENCOUNTER — CLINICAL SUPPORT (OUTPATIENT)
Dept: ORTHOPEDIC SURGERY | Facility: CLINIC | Age: 67
End: 2017-09-29

## 2017-09-29 DIAGNOSIS — M17.11 PRIMARY OSTEOARTHRITIS OF RIGHT KNEE: Primary | ICD-10-CM

## 2017-09-29 PROCEDURE — 20610 DRAIN/INJ JOINT/BURSA W/O US: CPT | Performed by: NURSE PRACTITIONER

## 2017-09-29 NOTE — PROGRESS NOTES
Large Joint Arthrocentesis  Date/Time: 9/29/2017 9:59 AM  Consent given by: patient  Site marked: site marked  Timeout: Immediately prior to procedure a time out was called to verify the correct patient, procedure, equipment, support staff and site/side marked as required   Supporting Documentation  Indications: pain   Procedure Details  Location: knee - R knee  Preparation: Patient was prepped and draped in the usual sterile fashion  Needle size: 22 G  Medications administered: 30 mg Hyaluronan 30 MG/2ML  Patient tolerance: patient tolerated the procedure well with no immediate complications      Patient presented today for viscosupplement injection.  This is the second injection for the right knee.  We will see patient back in one week.  We reviewed risks benefits and alternatives of the procedure and patient wished to proceed today and had all questions answered.

## 2017-10-11 ENCOUNTER — CLINICAL SUPPORT (OUTPATIENT)
Dept: ORTHOPEDIC SURGERY | Facility: CLINIC | Age: 67
End: 2017-10-11

## 2017-10-11 VITALS — HEIGHT: 69 IN | BODY MASS INDEX: 41.47 KG/M2 | WEIGHT: 280 LBS

## 2017-10-11 DIAGNOSIS — M17.11 PRIMARY OSTEOARTHRITIS OF RIGHT KNEE: Primary | ICD-10-CM

## 2017-10-11 PROCEDURE — 20610 DRAIN/INJ JOINT/BURSA W/O US: CPT | Performed by: NURSE PRACTITIONER

## 2017-10-11 NOTE — PROGRESS NOTES
Procedure   Large Joint Arthrocentesis  Date/Time: 10/11/2017 11:24 AM  Consent given by: patient  Site marked: site marked  Timeout: Immediately prior to procedure a time out was called to verify the correct patient, procedure, equipment, support staff and site/side marked as required   Supporting Documentation  Indications: pain   Procedure Details  Location: knee - R knee  Preparation: Patient was prepped and draped in the usual sterile fashion  Needle size: 22 G  Approach: superior  Medications administered: 30 mg Hyaluronan 30 MG/2ML  Patient tolerance: patient tolerated the procedure well with no immediate complications          Patient presented today for viscosupplement injection.  This is the third injection for the right knee.  We will see patient back in six weeks with Dr. Stoddard.  We reviewed risks benefits and alternatives of the procedure and patient wished to proceed today and had all questions answered.

## 2017-11-05 ENCOUNTER — RESULTS ENCOUNTER (OUTPATIENT)
Dept: FAMILY MEDICINE CLINIC | Facility: CLINIC | Age: 67
End: 2017-11-05

## 2017-11-05 DIAGNOSIS — I25.10 CORONARY ARTERY DISEASE INVOLVING NATIVE CORONARY ARTERY OF NATIVE HEART WITHOUT ANGINA PECTORIS: ICD-10-CM

## 2017-11-05 DIAGNOSIS — I10 HTN (HYPERTENSION), BENIGN: ICD-10-CM

## 2017-11-05 DIAGNOSIS — E55.9 VITAMIN D DEFICIENCY: ICD-10-CM

## 2017-11-05 DIAGNOSIS — E78.5 HYPERLIPIDEMIA, UNSPECIFIED HYPERLIPIDEMIA TYPE: ICD-10-CM

## 2017-11-05 DIAGNOSIS — E11.9 CONTROLLED TYPE 2 DIABETES MELLITUS WITHOUT COMPLICATION, WITHOUT LONG-TERM CURRENT USE OF INSULIN (HCC): ICD-10-CM

## 2017-11-28 ENCOUNTER — OFFICE VISIT (OUTPATIENT)
Dept: ORTHOPEDIC SURGERY | Facility: CLINIC | Age: 67
End: 2017-11-28

## 2017-11-28 DIAGNOSIS — M17.11 PRIMARY OSTEOARTHRITIS OF RIGHT KNEE: Primary | ICD-10-CM

## 2017-11-28 DIAGNOSIS — M79.2 NEUROPATHIC PAIN OF LOWER EXTREMITY, RIGHT: ICD-10-CM

## 2017-11-28 PROCEDURE — 99213 OFFICE O/P EST LOW 20 MIN: CPT | Performed by: ORTHOPAEDIC SURGERY

## 2017-12-07 ENCOUNTER — HOSPITAL ENCOUNTER (OUTPATIENT)
Dept: INFUSION THERAPY | Facility: HOSPITAL | Age: 67
Discharge: HOME | End: 2017-12-07
Attending: ORTHOPAEDIC SURGERY | Admitting: PSYCHIATRY & NEUROLOGY

## 2017-12-07 DIAGNOSIS — M79.2 NEUROPATHIC PAIN OF LOWER EXTREMITY, RIGHT: ICD-10-CM

## 2017-12-07 PROCEDURE — 95908 NRV CNDJ TST 3-4 STUDIES: CPT | Performed by: PSYCHIATRY & NEUROLOGY

## 2017-12-07 PROCEDURE — 95886 MUSC TEST DONE W/N TEST COMP: CPT | Performed by: PSYCHIATRY & NEUROLOGY

## 2017-12-07 PROCEDURE — 95908 NRV CNDJ TST 3-4 STUDIES: CPT

## 2017-12-07 PROCEDURE — 95886 MUSC TEST DONE W/N TEST COMP: CPT

## 2017-12-07 NOTE — PROCEDURES
EMG REPORT    Indication: Lower extremity pain and numbness    Findings: Left peroneal motor study showed normal distal latency velocity and amplitude.  Bilateral tibial motor study showed normal distal latencies amplitudes and F wave latencies.    Concentric needle EMG of bilateral vastus lateralis, vastus medialis, anterior tibialis, peroneus, gastrocnemius muscles showed no abnormality.    Impression: Normal EMG and nerve conduction studies of both lower extremities.    Thank you for sending this patient for further evaluation.  Roverto Stevenson M.D.

## 2018-01-09 ENCOUNTER — APPOINTMENT (OUTPATIENT)
Dept: SLEEP MEDICINE | Facility: HOSPITAL | Age: 68
End: 2018-01-09
Attending: INTERNAL MEDICINE

## 2018-01-11 ENCOUNTER — OFFICE VISIT (OUTPATIENT)
Dept: ORTHOPEDIC SURGERY | Facility: CLINIC | Age: 68
End: 2018-01-11

## 2018-01-11 DIAGNOSIS — M17.11 PRIMARY OSTEOARTHRITIS OF RIGHT KNEE: Primary | ICD-10-CM

## 2018-01-11 PROCEDURE — 99213 OFFICE O/P EST LOW 20 MIN: CPT | Performed by: ORTHOPAEDIC SURGERY

## 2018-01-11 PROCEDURE — 20610 DRAIN/INJ JOINT/BURSA W/O US: CPT | Performed by: ORTHOPAEDIC SURGERY

## 2018-01-11 RX ORDER — BUPIVACAINE HYDROCHLORIDE 5 MG/ML
4 INJECTION, SOLUTION EPIDURAL; INTRACAUDAL
Status: COMPLETED | OUTPATIENT
Start: 2018-01-11 | End: 2018-01-11

## 2018-01-11 RX ORDER — TRIAMCINOLONE ACETONIDE 40 MG/ML
80 INJECTION, SUSPENSION INTRA-ARTICULAR; INTRAMUSCULAR
Status: COMPLETED | OUTPATIENT
Start: 2018-01-11 | End: 2018-01-11

## 2018-01-11 RX ORDER — LIDOCAINE HYDROCHLORIDE 10 MG/ML
4 INJECTION, SOLUTION EPIDURAL; INFILTRATION; INTRACAUDAL; PERINEURAL
Status: COMPLETED | OUTPATIENT
Start: 2018-01-11 | End: 2018-01-11

## 2018-01-11 RX ADMIN — TRIAMCINOLONE ACETONIDE 80 MG: 40 INJECTION, SUSPENSION INTRA-ARTICULAR; INTRAMUSCULAR at 10:47

## 2018-01-11 RX ADMIN — BUPIVACAINE HYDROCHLORIDE 4 ML: 5 INJECTION, SOLUTION EPIDURAL; INTRACAUDAL at 10:47

## 2018-01-11 RX ADMIN — LIDOCAINE HYDROCHLORIDE 4 ML: 10 INJECTION, SOLUTION EPIDURAL; INFILTRATION; INTRACAUDAL; PERINEURAL at 10:47

## 2018-01-11 NOTE — PROGRESS NOTES
Subjective:     Patient ID: Simba Mosher is a 67 y.o. male.    Chief Complaint:  Follow-up right knee pain, DJD  History of Present Illness  Simba Mosher returns to clinic today for evaluation of right knee, states that viscous supplement injection had been giving him significant relief up until the last 4 weeks, has noted increasing pain over the medial anterior aspect of his right knee, exacerbated with prolonged weightbearing and walking, rates as a 6-7 out of 10 and describes as aching in nature at this time, denies radiation of pain.  Minimal improvement with rest and anti-inflammatory medications.  Mild associated swelling does wax and wane.  Denies associated numbness or tingling.  Still does describe moderate levels of burning pain over the anterior aspect of his right leg.     Social History     Occupational History   • Not on file.     Social History Main Topics   • Smoking status: Former Smoker   • Smokeless tobacco: Never Used   • Alcohol use No   • Drug use: No   • Sexual activity: Defer      Past Medical History:   Diagnosis Date   • Cervical disc disorder    • Diabetes mellitus    • Fracture of wrist    • Hypertension      Past Surgical History:   Procedure Laterality Date   • CERVICAL SPINE SURGERY     • EYE SURGERY Left    • HYDROCELE EXCISION / REPAIR Bilateral    • TONSILLECTOMY         Family History   Problem Relation Age of Onset   • Cancer Mother      melanoma   • Migraines Mother    • Lupus Sister          Review of Systems   Constitutional: Negative for chills, diaphoresis, fever and unexpected weight change.   HENT: Negative for hearing loss, nosebleeds, sore throat and tinnitus.    Eyes: Negative for pain and visual disturbance.   Respiratory: Negative for cough, shortness of breath and wheezing.    Cardiovascular: Negative for chest pain and palpitations.   Gastrointestinal: Negative for abdominal pain, diarrhea, nausea and vomiting.   Endocrine: Negative for cold intolerance,  heat intolerance and polydipsia.   Genitourinary: Negative for difficulty urinating, dysuria and hematuria.   Musculoskeletal: Positive for arthralgias. Negative for joint swelling and myalgias.   Skin: Negative for rash and wound.   Allergic/Immunologic: Negative for environmental allergies.   Neurological: Negative for dizziness, syncope and numbness.   Hematological: Does not bruise/bleed easily.   Psychiatric/Behavioral: Negative for dysphoric mood and sleep disturbance. The patient is not nervous/anxious.    All other systems reviewed and are negative.          Objective:  There were no vitals filed for this visit.  There were no vitals filed for this visit.  There is no height or weight on file to calculate BMI.  General: No acute distress.  Resp: normal respiratory effort  Skin: no rashes or wounds; normal turgor  Psych: mood and affect appropriate; recent and remote memory intact         Ortho Exam    Right knee-active range of motion 2-120°, 4+ out of 5 strength on flexion and extension, moderate effusion noted.  Maximal tenderness along medial joint line as well as medial and lateral patellar facet, positive external compression test, stable to varus and valgus stress at 2 and 30°.  Chronic lymphedema and venous stasis changes noted bilaterally.    Imaging:  Review of outside EMG and nerve conduction study results indicate no evidence of neurologic pathology.    Assessment:       1. Primary osteoarthritis of right knee          Plan:  JOSE R query complete.      Large Joint Arthrocentesis  Date/Time: 1/11/2018 10:47 AM  Consent given by: patient  Site marked: site marked  Supporting Documentation  Indications: pain   Procedure Details  Location: knee - R knee  Needle size: 22 G  Approach: superior  Medications administered: 80 mg triamcinolone acetonide 40 MG/ML; 4 mL bupivacaine (PF) 0.5 %; 4 mL lidocaine PF 1% 1 %  Patient tolerance: patient tolerated the procedure well with no immediate  complications          Discussed treatment options at length with patient at today's visit. Given recurrence of pain at this point time we discussed options, patient is 3 months status post this will supplement injection series.  He would like to proceed with repeat cortisone injection today given recurrence of pain to help with his interval symptoms.  Continue home exercise program, strengthening, weight loss, activity modification.    Simba Mosher was in agreement with plan and had all questions answered.     Orders:  Orders Placed This Encounter   Procedures   • Large Joint Arthrocentesis       Medications:  No orders of the defined types were placed in this encounter.      Followup:  Return in about 3 months (around 4/11/2018).    Simba was seen today for follow-up and pain.    Diagnoses and all orders for this visit:    Primary osteoarthritis of right knee  -     Large Joint Arthrocentesis        Dictated utilizing Dragon dictation

## 2018-02-06 ENCOUNTER — OFFICE VISIT (OUTPATIENT)
Dept: SLEEP MEDICINE | Facility: HOSPITAL | Age: 68
End: 2018-02-06
Attending: INTERNAL MEDICINE

## 2018-02-06 VITALS
BODY MASS INDEX: 42.13 KG/M2 | HEART RATE: 97 BPM | HEIGHT: 68 IN | SYSTOLIC BLOOD PRESSURE: 161 MMHG | DIASTOLIC BLOOD PRESSURE: 89 MMHG | WEIGHT: 278 LBS

## 2018-02-06 DIAGNOSIS — E66.01 MORBID OBESITY WITH BODY MASS INDEX OF 40.0-49.9 (HCC): ICD-10-CM

## 2018-02-06 DIAGNOSIS — J44.9 COPD, MILD (HCC): ICD-10-CM

## 2018-02-06 DIAGNOSIS — G47.33 OBSTRUCTIVE SLEEP APNEA, ADULT: Primary | ICD-10-CM

## 2018-02-06 DIAGNOSIS — I10 ESSENTIAL HYPERTENSION: ICD-10-CM

## 2018-02-06 PROCEDURE — G0463 HOSPITAL OUTPT CLINIC VISIT: HCPCS

## 2018-02-06 NOTE — PROGRESS NOTES
[facility] SLEEP MEDICINE  [address]  [address]  [place] [address]  [phone]    PCP: Manjeet Crowe MD    Reason for visit:  Sleep disorders: ABRAHAM    Simba Mosher is a 67 y.o.male who was seen in the [facility] today. Seen last in July 2017 and pressures increased to 16 cms and he feels this helped. Sleeps from 9p to 4a. ESS is 17 and he has increased daytime sleepiness. He is falling asleep inappropriately. Using FFM and fits well. No air leaks and no dry mouth. No recent changes to meds. He feels his sleepiness has been going on for 2-3 months.    SH- no tobacco, no alcohol, drinks 2 cups of coffee per day.    Current Medications:    Current Outpatient Prescriptions:   •  ARIPiprazole (ABILIFY) 20 MG tablet, Take 1 tablet by mouth Daily., Disp: 90 tablet, Rfl: 1  •  atorvastatin (LIPITOR) 20 MG tablet, TK 1 T PO D, Disp: , Rfl: 3  •  buPROPion XL (WELLBUTRIN XL) 300 MG 24 hr tablet, Take 1 tablet by mouth Every Morning., Disp: 90 tablet, Rfl: 1  •  celecoxib (CELEBREX) 200 MG capsule, Take 1 capsule by mouth Daily., Disp: 30 capsule, Rfl: 3  •  Cholecalciferol (VITAMIN D) 2000 UNITS tablet, Take 2,000 Units by mouth., Disp: , Rfl:   •  desonide (DESOWEN) 0.05 % cream, , Disp: , Rfl:   •  FLUoxetine (PROzac) 40 MG capsule, Take 1 capsule by mouth Daily., Disp: 90 capsule, Rfl: 1  •  furosemide (LASIX) 80 MG tablet, Take 1 tablet by mouth Daily As Needed (water retention)., Disp: 30 tablet, Rfl: 3  •  gabapentin (NEURONTIN) 400 MG capsule, Take 400 mg by mouth 3 (Three) Times a Day., Disp: , Rfl:   •  glucose monitor monitoring kit, 1 each Daily. One touch blue ultra, Disp: 1 each, Rfl: 0  •  HYDROcodone-acetaminophen (NORCO) 7.5-325 MG per tablet, TK 1 T PO  Q 6 H PRN, Disp: , Rfl: 0  •  ketoconazole (NIZORAL) 2 % cream, Apply  topically daily., Disp: 30 g, Rfl: 2  •  lisinopril (PRINIVIL,ZESTRIL) 40 MG tablet, TAKE 1 TABLET BY MOUTH EVERY DAY, Disp: 90 tablet, Rfl:  "0  •  metFORMIN (GLUCOPHAGE) 500 MG tablet, Take 1 tablet by mouth 2 (Two) Times a Day With Meals., Disp: 160 tablet, Rfl: 1  •  ONE TOUCH ULTRA TEST test strip, CHECK BLOOD SUGAR ONCE DAILY, Disp: 100 each, Rfl: 1  •  pantoprazole (PROTONIX) 40 MG EC tablet, TAKE 1 TABLET BY MOUTH DAILY, Disp: 90 tablet, Rfl: 0  •  potassium chloride (K-DUR) 10 MEQ CR tablet, 1 po qd, Disp: 30 tablet, Rfl: 5  •  tiZANidine (ZANAFLEX) 4 MG tablet, , Disp: , Rfl:    also entered in Sleep Questionnaire    Patient  has a past medical history of Cervical disc disorder; Diabetes mellitus; Fracture of wrist; and Hypertension.     I have reviewed the Past Medical History, Past Surgical History, Social History and Family History in EPIC and Sleep Questionnaire.    Pertinent Positive Review of Systems of PND and nasal congestion, anxiety and depression.  Rest of Review of Systems was negative as recorded in Sleep Questionnaire.        Vital Signs: /89  Pulse 97  Ht 171.5 cm (67.5\")  Wt 126 kg (278 lb)  BMI 42.9 kg/m2        General: Alert. Cooperative. Well developed. No acute distress.             Head:  Normocephalic. Symmetrical. Atraumatic.              Nose: No septal deviation. No drainage.          Throat: No oral lesions. No thrush. Moist mucous membranes.    Tongue is normal and dentition is intact       Pharynx: Posterior pharyngeal pillars are wide with Mallampati score of Class III     Chest Wall:  Normal shape. Symmetric expansion with respiration. No tenderness.             Neck:  Trachea midline.           Lungs:  Clear to auscultation bilaterally. No wheezes. No rhonchi. No rales. Respirations regular, even and unlabored.            Heart:  Regular rhythm and normal rate. Normal S1 and S2. No murmur.     Abdomen:  Soft, non-tender and non-distended. Normal bowel sounds. No masses.  Extremities:  Moves all extremities well. No edema.    Psychiatric: Normal mood and affect.    Study:  · Split 2/14/17  Overnight split " polysomnogram study.  Diagnostic study from 9:28 PM to 10:40 PM.  Sleep efficiency 93% with 1.13 hours total sleep time.  Sleep distribution shows absence of slow-wave sleep and REM sleep.  Apnea hypopneas index is 92 events an hour indicating very severe obstructive sleep apnea.  Oxygen saturation below 89% for 2.4 minutes or 3.5% of total sleep time.  Snoring noticed for 40% of total sleep time even on this short diagnostic study.  Titration study from 10:40 PM to 5:15 AM.  Sleep efficiency 95% with 6.23 hours total sleep time.  Sleep distribution shows a rebound in rem sleep to 37%.  Slow-wave sleep and remains low at 1.2%.  Apneas hypopneas were reduced.  Oxygen saturation during the night remained above 88%.  Arousal index was reduced.  Snoring was reduced.  CPAP was increased up to 13 cm water pressure.  Maximum sleep achieved at 10 cm water pressure with to 85 minutes of REM sleep.  Supine position achieved in all sleep positions.  A higher pressure of 13 cm is required to eliminate all apneas hypopneas.    Testing:  · D/L unavailable    DME Company: [employer]    Impression:  1. Obstructive sleep apnea, adult    2. Morbid obesity with body mass index of 40.0-49.9    3. COPD, mild    4. Essential hypertension        Plan:  He was asked to bring chip and I will review download. May need stimulants - he has a psychiatrist. He requests to come back a different day. His BP and HR are elevated today and he was asked to recheck it and  get with PCP if still high.    Patient will follow up in this clinic in 1 month     Thank you for allowing me to participate in your patient's care.    Aubrey Little MD    Part of this note may be an electronic transcription/translation of spoken language to printed text using the Dragon Dictation System.

## 2018-02-13 PROBLEM — E66.01 MORBID OBESITY WITH BODY MASS INDEX OF 40.0-49.9 (HCC): Status: ACTIVE | Noted: 2018-02-13

## 2018-02-13 PROBLEM — G47.33 OBSTRUCTIVE SLEEP APNEA, ADULT: Status: ACTIVE | Noted: 2018-02-13

## 2018-02-13 PROBLEM — J44.9 COPD, MILD (HCC): Status: ACTIVE | Noted: 2018-02-13

## 2018-03-06 ENCOUNTER — OFFICE VISIT (OUTPATIENT)
Dept: SLEEP MEDICINE | Facility: HOSPITAL | Age: 68
End: 2018-03-06
Attending: INTERNAL MEDICINE

## 2018-03-06 VITALS
BODY MASS INDEX: 41.83 KG/M2 | DIASTOLIC BLOOD PRESSURE: 78 MMHG | HEART RATE: 87 BPM | WEIGHT: 276 LBS | HEIGHT: 68 IN | SYSTOLIC BLOOD PRESSURE: 151 MMHG

## 2018-03-06 DIAGNOSIS — G47.10 HYPERSOMNOLENCE: ICD-10-CM

## 2018-03-06 DIAGNOSIS — J44.9 CHRONIC OBSTRUCTIVE PULMONARY DISEASE, UNSPECIFIED COPD TYPE (HCC): ICD-10-CM

## 2018-03-06 DIAGNOSIS — G47.33 OBSTRUCTIVE SLEEP APNEA: Primary | ICD-10-CM

## 2018-03-06 DIAGNOSIS — E66.01 OBESITY, MORBID, BMI 40.0-49.9 (HCC): ICD-10-CM

## 2018-03-06 PROCEDURE — G0463 HOSPITAL OUTPT CLINIC VISIT: HCPCS

## 2018-03-20 ENCOUNTER — OFFICE VISIT (OUTPATIENT)
Dept: SLEEP MEDICINE | Facility: HOSPITAL | Age: 68
End: 2018-03-20
Attending: INTERNAL MEDICINE

## 2018-03-20 VITALS
DIASTOLIC BLOOD PRESSURE: 66 MMHG | HEART RATE: 95 BPM | BODY MASS INDEX: 41.68 KG/M2 | HEIGHT: 68 IN | SYSTOLIC BLOOD PRESSURE: 132 MMHG | WEIGHT: 275 LBS

## 2018-03-20 DIAGNOSIS — R68.2 DRY MOUTH: ICD-10-CM

## 2018-03-20 DIAGNOSIS — E66.01 OBESITY, MORBID, BMI 40.0-49.9 (HCC): ICD-10-CM

## 2018-03-20 DIAGNOSIS — J44.9 STAGE 1 MILD COPD BY GOLD CLASSIFICATION (HCC): ICD-10-CM

## 2018-03-20 DIAGNOSIS — G47.33 OBSTRUCTIVE SLEEP APNEA: Primary | ICD-10-CM

## 2018-03-20 DIAGNOSIS — G47.10 PERSISTENT HYPERSOMNIA: ICD-10-CM

## 2018-03-20 PROCEDURE — G0463 HOSPITAL OUTPT CLINIC VISIT: HCPCS

## 2018-03-20 NOTE — PROGRESS NOTES
[facility] SLEEP MEDICINE  [address]  [address]  [facility] [address]  [phone]    PCP: Manjeet Crowe MD    Reason for visit:  Sleep disorders: ABRAHAM    Simba Mosher is a 67 y.o.male who was seen in the Sleep Disorders Center today. He has been using the CPAP for past 2 weeks. His tongue is excessively dry. He uses a FFM. He has a chin strap but does not know how to use it. He sleeps about 6 hrs. He feels the machine pressure is okay. He wakes up at 2AM and is unable to sleep thereafter. He tends to feel sleepy in afternoon. ESS is 21.    No cigs, no alc, 1-2 coffee cups per day    Current Medications:    Current Outpatient Prescriptions:   •  ARIPiprazole (ABILIFY) 20 MG tablet, Take 1 tablet by mouth Daily., Disp: 90 tablet, Rfl: 1  •  atorvastatin (LIPITOR) 20 MG tablet, TK 1 T PO D, Disp: , Rfl: 3  •  buPROPion XL (WELLBUTRIN XL) 300 MG 24 hr tablet, Take 1 tablet by mouth Every Morning., Disp: 90 tablet, Rfl: 1  •  celecoxib (CELEBREX) 200 MG capsule, Take 1 capsule by mouth Daily., Disp: 30 capsule, Rfl: 3  •  Cholecalciferol (VITAMIN D) 2000 UNITS tablet, Take 2,000 Units by mouth., Disp: , Rfl:   •  desonide (DESOWEN) 0.05 % cream, , Disp: , Rfl:   •  FLUoxetine (PROzac) 40 MG capsule, Take 1 capsule by mouth Daily., Disp: 90 capsule, Rfl: 1  •  furosemide (LASIX) 80 MG tablet, Take 1 tablet by mouth Daily As Needed (water retention)., Disp: 30 tablet, Rfl: 3  •  gabapentin (NEURONTIN) 400 MG capsule, Take 400 mg by mouth 3 (Three) Times a Day., Disp: , Rfl:   •  glucose monitor monitoring kit, 1 each Daily. One touch blue ultra, Disp: 1 each, Rfl: 0  •  HYDROcodone-acetaminophen (NORCO) 7.5-325 MG per tablet, TK 1 T PO  Q 6 H PRN, Disp: , Rfl: 0  •  ketoconazole (NIZORAL) 2 % cream, Apply  topically daily., Disp: 30 g, Rfl: 2  •  lisinopril (PRINIVIL,ZESTRIL) 40 MG tablet, TAKE 1 TABLET BY MOUTH EVERY DAY, Disp: 90 tablet, Rfl: 0  •  metFORMIN (GLUCOPHAGE) 500 MG tablet,  "Take 1 tablet by mouth 2 (Two) Times a Day With Meals., Disp: 160 tablet, Rfl: 1  •  ONE TOUCH ULTRA TEST test strip, CHECK BLOOD SUGAR ONCE DAILY, Disp: 100 each, Rfl: 1  •  pantoprazole (PROTONIX) 40 MG EC tablet, TAKE 1 TABLET BY MOUTH DAILY, Disp: 90 tablet, Rfl: 0  •  potassium chloride (K-DUR) 10 MEQ CR tablet, 1 po qd, Disp: 30 tablet, Rfl: 5  •  tiZANidine (ZANAFLEX) 4 MG tablet, , Disp: , Rfl:    also entered in Sleep Questionnaire    Patient  has a past medical history of Cervical disc disorder; Diabetes mellitus; Fracture of wrist; and Hypertension.   I have reviewed the Past Medical History, Past Surgical History, Social History and Family History in EPIC and Sleep Questionnaire.    Pertinent Positive Review of Systems of nasal congestion, anxiety, depression  Rest of Review of Systems was negative as recorded in Sleep Questionnaire.        Vital Signs: /66   Pulse 95   Ht 172.7 cm (68\")   Wt 125 kg (275 lb)   BMI 41.81 kg/m²         General: Alert. Cooperative. Well developed. No acute distress.             Head:  Normocephalic. Symmetrical. Atraumatic.              Nose: No septal deviation. No drainage.          Throat: No oral lesions. No thrush. Moist mucous membranes.    Tongue is large and dentition is missing teeth       Pharynx: Posterior pharyngeal pillars are wide with Mallampati score of II     Chest Wall:  Normal shape. Symmetric expansion with respiration. No tenderness.             Neck:  Trachea midline.           Lungs:  Clear to auscultation bilaterally. No wheezes. No rhonchi. No rales. Respirations regular, even and unlabored.            Heart:  Regular rhythm and normal rate. Normal S1 and S2. No murmur.     Abdomen:  Soft, non-tender and non-distended. Normal bowel sounds. No masses.  Extremities:  Moves all extremities well. No edema.    Psychiatric: Normal mood and affect.    Study:  · Split 2/14/17  Overnight split polysomnogram study.  Diagnostic study from 9:28 PM to " 10:40 PM.  Sleep efficiency 93% with 1.13 hours total sleep time.  Sleep distribution shows absence of slow-wave sleep and REM sleep.  Apnea hypopneas index is 92 events an hour indicating very severe obstructive sleep apnea.  Oxygen saturation below 89% for 2.4 minutes or 3.5% of total sleep time.  Snoring noticed for 40% of total sleep time even on this short diagnostic study.  Titration study from 10:40 PM to 5:15 AM.  Sleep efficiency 95% with 6.23 hours total sleep time.  Sleep distribution shows a rebound in rem sleep to 37%.  Slow-wave sleep and remains low at 1.2%.  Apneas hypopneas were reduced.  Oxygen saturation during the night remained above 88%.  Arousal index was reduced.  Snoring was reduced.  CPAP was increased up to 13 cm water pressure.  Maximum sleep achieved at 10 cm water pressure with to 85 minutes of REM sleep.  Supine position achieved in all sleep positions.  A higher pressure of 13 cm is required to eliminate all apneas hypopneas.    Testing:  · The last 2 weeks patient has used his device for an average 4 hours 44 minutes.  AHI is 8.6 with CPAP set at 16 and oblique for 1 hour 31 minutes.    [facility]: [facility]    Impression:  1. Obstructive sleep apnea    2. Dry mouth    3. Obesity, morbid, BMI 40.0-49.9    4. Persistent hypersomnia    5. Stage 1 mild COPD by GOLD classification        Plan:  The high AHI may be due to excessive air leaks.  I will therefore trial him on a lower auto pressure off 10-20.  Hopefully he'll be more tolerant of this pressure and his dry mouth with improved.  He was advised to also uses chinstrap.  We continued necessity of good compliance with the CPAP was again emphasized.    He has mild COPD but no symptoms from the same at this present time.    I reiterated the importance of effective treatment of obstructive sleep apnea with PAP machine.  Cardiovascular health risks of untreated sleep apnea were again reviewed.  Patient was asked to remain cautious if there  is persistent hypersomnolence.    Change of PAP supplies regularly is important for effective use.  Change of cushion on the mask or plugs on nasal pillows along with disposable filters once every month and change of mask frame, tubing, headgear and Velcro straps every 6 months at the minimum was reiterated.    Patient will follow up in this clinic in 1 month     Thank you for allowing me to participate in your patient's care.    Aubrey Little MD    Part of this note may be an electronic transcription/translation of spoken language to printed text using the Dragon Dictation System.

## 2018-04-19 ENCOUNTER — OFFICE VISIT (OUTPATIENT)
Dept: ORTHOPEDIC SURGERY | Facility: CLINIC | Age: 68
End: 2018-04-19

## 2018-04-19 DIAGNOSIS — M17.11 PRIMARY OSTEOARTHRITIS OF RIGHT KNEE: Primary | ICD-10-CM

## 2018-04-19 PROCEDURE — 99213 OFFICE O/P EST LOW 20 MIN: CPT | Performed by: ORTHOPAEDIC SURGERY

## 2018-04-19 PROCEDURE — 20610 DRAIN/INJ JOINT/BURSA W/O US: CPT | Performed by: ORTHOPAEDIC SURGERY

## 2018-04-19 RX ORDER — AMLODIPINE BESYLATE 10 MG/1
10 TABLET ORAL
COMMUNITY
Start: 2018-03-15 | End: 2018-06-13

## 2018-04-19 RX ADMIN — LIDOCAINE HYDROCHLORIDE 8 ML: 10 INJECTION, SOLUTION EPIDURAL; INFILTRATION; INTRACAUDAL; PERINEURAL at 10:33

## 2018-04-19 RX ADMIN — TRIAMCINOLONE ACETONIDE 80 MG: 40 INJECTION, SUSPENSION INTRA-ARTICULAR; INTRAMUSCULAR at 10:33

## 2018-04-24 ENCOUNTER — APPOINTMENT (OUTPATIENT)
Dept: SLEEP MEDICINE | Facility: HOSPITAL | Age: 68
End: 2018-04-24
Attending: INTERNAL MEDICINE

## 2018-04-26 RX ORDER — PANTOPRAZOLE SODIUM 40 MG/1
TABLET, DELAYED RELEASE ORAL
Qty: 90 TABLET | Refills: 0 | OUTPATIENT
Start: 2018-04-26

## 2018-05-05 RX ORDER — LIDOCAINE HYDROCHLORIDE 10 MG/ML
8 INJECTION, SOLUTION EPIDURAL; INFILTRATION; INTRACAUDAL; PERINEURAL
Status: COMPLETED | OUTPATIENT
Start: 2018-04-19 | End: 2018-04-19

## 2018-05-05 RX ORDER — TRIAMCINOLONE ACETONIDE 40 MG/ML
80 INJECTION, SUSPENSION INTRA-ARTICULAR; INTRAMUSCULAR
Status: COMPLETED | OUTPATIENT
Start: 2018-04-19 | End: 2018-04-19

## 2018-05-08 ENCOUNTER — OFFICE VISIT (OUTPATIENT)
Dept: SLEEP MEDICINE | Facility: HOSPITAL | Age: 68
End: 2018-05-08
Attending: INTERNAL MEDICINE

## 2018-05-08 VITALS
WEIGHT: 267 LBS | DIASTOLIC BLOOD PRESSURE: 72 MMHG | BODY MASS INDEX: 40.47 KG/M2 | HEART RATE: 83 BPM | HEIGHT: 68 IN | SYSTOLIC BLOOD PRESSURE: 124 MMHG

## 2018-05-08 DIAGNOSIS — E66.01 OBESITY, MORBID, BMI 40.0-49.9 (HCC): ICD-10-CM

## 2018-05-08 DIAGNOSIS — G47.33 OBSTRUCTIVE SLEEP APNEA: Primary | ICD-10-CM

## 2018-05-08 PROCEDURE — G0463 HOSPITAL OUTPT CLINIC VISIT: HCPCS

## 2018-05-08 NOTE — PROGRESS NOTES
[facility] SLEEP MEDICINE  [address]  [address]  [place] [address]  [phone]    PCP: Manjeet Crowe MD    Reason for visit:  Sleep disorders: ABRAHAM    Simba is a 67 y.o.male who was seen in the [facility] today. Last visit we lowered pressures and he is tolerating better. He wakes up more rested. Sleeps from 9-10p to 4:30a. He uses a FFM with chin strap. Fits well and no air leaks. Occasional dry mouth.  Simba  reports that he has quit smoking. He has never used smokeless tobacco.  Simba  reports that he does not drink alcohol. 0 per week  Simbahas caffeine intake 1-2 per day coffee.  Morley Sleepiness Scale is 13.    Pertinent Positive Review of Systems of nasal congestion, PND, anxiety, depression  Rest of Review of Systems was negative as recorded in Sleep Questionnaire.    Current Medications:    Current Outpatient Prescriptions:   •  amLODIPine (NORVASC) 10 MG tablet, Take 10 mg by mouth., Disp: , Rfl:   •  ARIPiprazole (ABILIFY) 20 MG tablet, Take 1 tablet by mouth Daily., Disp: 90 tablet, Rfl: 1  •  atorvastatin (LIPITOR) 20 MG tablet, TK 1 T PO D, Disp: , Rfl: 3  •  buPROPion XL (WELLBUTRIN XL) 300 MG 24 hr tablet, Take 1 tablet by mouth Every Morning., Disp: 90 tablet, Rfl: 1  •  celecoxib (CELEBREX) 200 MG capsule, Take 1 capsule by mouth Daily., Disp: 30 capsule, Rfl: 3  •  Cholecalciferol (VITAMIN D) 2000 UNITS tablet, Take 2,000 Units by mouth., Disp: , Rfl:   •  desonide (DESOWEN) 0.05 % cream, , Disp: , Rfl:   •  FLUoxetine (PROzac) 40 MG capsule, Take 1 capsule by mouth Daily., Disp: 90 capsule, Rfl: 1  •  furosemide (LASIX) 80 MG tablet, Take 1 tablet by mouth Daily As Needed (water retention)., Disp: 30 tablet, Rfl: 3  •  gabapentin (NEURONTIN) 400 MG capsule, Take 400 mg by mouth 3 (Three) Times a Day., Disp: , Rfl:   •  glucose monitor monitoring kit, 1 each Daily. One touch blue ultra, Disp: 1 each, Rfl: 0  •  HYDROcodone-acetaminophen (NORCO)  "7.5-325 MG per tablet, TK 1 T PO  Q 6 H PRN, Disp: , Rfl: 0  •  ketoconazole (NIZORAL) 2 % cream, Apply  topically daily., Disp: 30 g, Rfl: 2  •  lisinopril (PRINIVIL,ZESTRIL) 40 MG tablet, TAKE 1 TABLET BY MOUTH EVERY DAY, Disp: 90 tablet, Rfl: 0  •  metFORMIN (GLUCOPHAGE) 500 MG tablet, Take 1 tablet by mouth 2 (Two) Times a Day With Meals., Disp: 160 tablet, Rfl: 1  •  ONE TOUCH ULTRA TEST test strip, CHECK BLOOD SUGAR ONCE DAILY, Disp: 100 each, Rfl: 1  •  pantoprazole (PROTONIX) 40 MG EC tablet, TAKE 1 TABLET BY MOUTH DAILY, Disp: 90 tablet, Rfl: 0  •  potassium chloride (K-DUR) 10 MEQ CR tablet, 1 po qd, Disp: 30 tablet, Rfl: 5  •  tiZANidine (ZANAFLEX) 4 MG tablet, , Disp: , Rfl:    also entered in Sleep Questionnaire    Patient  has a past medical history of Cervical disc disorder; Diabetes mellitus; Fracture of wrist; and Hypertension.          Vital Signs: /72   Pulse 83   Ht 172.7 cm (68\")   Wt 121 kg (267 lb)   BMI 40.60 kg/m²    Body mass index is 40.6 kg/m².       Tongue: large      Dentition: good       Pharynx: Posterior pharyngeal pillars are wide   Mallampatti: II (hard and soft palate, upper portion of tonsils anduvula visible)          General: Alert. Cooperative. Well developed. No acute distress.             Head:  Normocephalic. Symmetrical. Atraumatic.              Nose: No septal deviation. No drainage.          Throat: No oral lesions. No thrush. Moist mucous membranes.    Chest Wall:  Normal shape. Symmetric expansion with respiration. No tenderness.             Neck:  Trachea midline.           Lungs:  Clear to auscultation bilaterally. No wheezes. No rhonchi. No rales. Respirations regular, even and unlabored.            Heart:  Regular rhythm and normal rate. Normal S1 and S2. No murmur.     Abdomen:  Soft, non-tender and non-distended. Normal bowel sounds. No masses.  Extremities:  Moves all extremities well. No edema.    Psychiatric: Normal mood and " affect.    Study:  · Split 2/14/17  Overnight split polysomnogram study.  Diagnostic study from 9:28 PM to 10:40 PM.  Sleep efficiency 93% with 1.13 hours total sleep time.  Sleep distribution shows absence of slow-wave sleep and REM sleep.  Apnea hypopneas index is 92 events an hour indicating very severe obstructive sleep apnea.  Oxygen saturation below 89% for 2.4 minutes or 3.5% of total sleep time.  Snoring noticed for 40% of total sleep time even on this short diagnostic study.  Titration study from 10:40 PM to 5:15 AM.  Sleep efficiency 95% with 6.23 hours total sleep time.  Sleep distribution shows a rebound in rem sleep to 37%.  Slow-wave sleep and remains low at 1.2%.  Apneas hypopneas were reduced.  Oxygen saturation during the night remained above 88%.  Arousal index was reduced.  Snoring was reduced.  CPAP was increased up to 13 cm water pressure.  Maximum sleep achieved at 10 cm water pressure with to 85 minutes of REM sleep.  Supine position achieved in all sleep positions.  A higher pressure of 13 cm is required to eliminate all apneas hypopneas.    Testing:  · His compliance has been 57.8% for the last 90 days.  Average usage 4 hours 57 minutes.  Patient had some difficulty using machine due to recent cold symptoms.  He is on an auto device setting now.  His AHI is 4.9 and went down after reducing the pressures.  His air leak is also better.    [facility]: [employer]    Impression:  1. Obstructive sleep apnea    2. Obesity, morbid, BMI 40.0-49.9        Plan:  Simba is doing better with the change in pressure to auto 10-20 cm.  As he seems to be remaining less than 15 cm I will further reduce his auto pressures to 10-15 cm.  His air leak is improved with lower airway pressures.  He was asked to remain compliant with her current device and he now feels rested when he wakes up.  He was asked to change his supplies regularly.    I reiterated the importance of effective treatment of obstructive sleep apnea  with PAP machine.  Cardiovascular health risks of untreated sleep apnea were again reviewed.  Patient was asked to remain cautious if there is persistent hypersomnolence. The benefit of weight loss in reducing severity of obstructive sleep apnea was discussed.  Patient would benefit from adhering to a strict diet to achieve ideal BMI.     Change of PAP supplies regularly is important for effective use.  Change of cushion on the mask or plugs on nasal pillows along with disposable filters once every month and change of mask frame, tubing, headgear and Velcro straps every 6 months at the minimum was reiterated.    This patient is compliant with PAP machine and benefits from its use.  Apnea hypopneas index is corrected/improved.  Daytime hypersomnolence has resolved.     Patient will follow up in this clinic in 6 months      Thank you for allowing me to participate in your patient's care.    Aubrey Little MD    Part of this note may be an electronic transcription/translation of spoken language to printed text using the Dragon Dictation System.

## 2018-05-15 RX ORDER — POTASSIUM CHLORIDE 750 MG/1
TABLET, EXTENDED RELEASE ORAL
Qty: 30 TABLET | Refills: 0 | OUTPATIENT
Start: 2018-05-15

## 2018-10-23 ENCOUNTER — APPOINTMENT (OUTPATIENT)
Dept: SLEEP MEDICINE | Facility: HOSPITAL | Age: 68
End: 2018-10-23
Attending: INTERNAL MEDICINE

## 2022-09-13 NOTE — PROGRESS NOTES
"Subjective   Simba Mosher is a 66 y.o. male with   Chief Complaint   Patient presents with   • Hypertension     lab follow up   .    History of Present Illness     Simba is a 66 yr old white male that presents today for follow up of HTN, HLD, Vitamin D Deficiency, and Type II Diabetes.  Currently Simba uses Atorvastatin to control cholesterol, Lisinopril to control HTN, Metformin to control diabetes, and also uses Bupropion and Abilify to improve symptoms of Bipolar 1.  Additionally he uses Pantoprazole to control symptoms of GERD.      The following portions of the patient's history were reviewed and updated as appropriate: allergies, current medications, past family history, past medical history, past social history, past surgical history and problem list.    Review of Systems   Cardiovascular:        HTN  HLD   Gastrointestinal: Positive for abdominal pain.        GERD   Endocrine:        Type II Diabetes  Vitamin D Deficiency   Psychiatric/Behavioral: The patient is nervous/anxious.        Objective     Vitals:    01/23/17 0852   BP: 130/80   Pulse: 100   Temp: 98.2 °F (36.8 °C)   SpO2: 95%     BP Readings from Last 3 Encounters:   01/23/17 130/80   11/14/16 150/98   09/09/16 142/86      Wt Readings from Last 3 Encounters:   01/23/17 278 lb (126 kg)   11/14/16 282 lb (128 kg)   09/09/16 270 lb (122 kg)      BMI: Estimated body mass index is 41.05 kg/(m^2) as calculated from the following:    Height as of this encounter: 69\" (175.3 cm).    Weight as of this encounter: 278 lb (126 kg).    BSA: Estimated body surface area is 2.38 meters squared as calculated from the following:    Height as of this encounter: 69\" (175.3 cm).    Weight as of this encounter: 278 lb (126 kg).  No results found for this or any previous visit (from the past 168 hour(s)).  Orders Only on 01/16/2017   Component Date Value Ref Range Status   • WBC 01/16/2017 8.87  4.80 - 10.80 10*3/mm3 Final   • RBC 01/16/2017 4.93  4.70 - 6.10 " 2nd request received for continuing disability form to be filled out  Scanned into media  Please advise if agreeable to complete  10*6/mm3 Final   • Hemoglobin 01/16/2017 13.7* 14.0 - 18.0 g/dL Final   • Hematocrit 01/16/2017 43.6  42.0 - 52.0 % Final   • MCV 01/16/2017 88.4  80.0 - 94.0 fL Final   • MCH 01/16/2017 27.8  27.0 - 31.0 pg Final   • MCHC 01/16/2017 31.4  31.0 - 37.0 g/dL Final   • RDW 01/16/2017 14.1  11.5 - 14.5 % Final   • Platelets 01/16/2017 284  140 - 500 10*3/mm3 Final   • Neutrophil Rel % 01/16/2017 54.5  45.0 - 70.0 % Final   • Lymphocyte Rel % 01/16/2017 32.4  20.0 - 45.0 % Final   • Monocyte Rel % 01/16/2017 8.1* 3.0 - 8.0 % Final   • Eosinophil Rel % 01/16/2017 3.4  0.0 - 4.0 % Final   • Basophil Rel % 01/16/2017 1.0  0.0 - 2.0 % Final   • Neutrophils Absolute 01/16/2017 4.84  1.50 - 8.30 10*3/mm3 Final   • Lymphocytes Absolute 01/16/2017 2.87  0.60 - 4.80 10*3/mm3 Final   • Monocytes Absolute 01/16/2017 0.72  0.00 - 1.00 10*3/mm3 Final   • Eosinophils Absolute 01/16/2017 0.30  0.10 - 0.30 10*3/mm3 Final   • Basophils Absolute 01/16/2017 0.09  0.00 - 0.20 10*3/mm3 Final   • Immature Granulocyte Rel % 01/16/2017 0.6* 0.0 - 0.5 % Final   • Immature Grans Absolute 01/16/2017 0.05* 0.00 - 0.03 10*3/mm3 Final   • nRBC 01/16/2017 0.0  0.0 - 0.0 /100 WBC Final   • Glucose 01/16/2017 105* 65 - 99 mg/dL Final   • BUN 01/16/2017 16  8 - 23 mg/dL Final   • Creatinine 01/16/2017 1.02  0.76 - 1.27 mg/dL Final   • eGFR Non  Am 01/16/2017 74  >60 mL/min/1.73 Final   • eGFR African Am 01/16/2017 89  >60 mL/min/1.73 Final   • BUN/Creatinine Ratio 01/16/2017 15.7  7.0 - 25.0 Final   • Sodium 01/16/2017 143  136 - 145 mmol/L Final   • Potassium 01/16/2017 4.9  3.5 - 5.2 mmol/L Final   • Chloride 01/16/2017 103  98 - 107 mmol/L Final   • Total CO2 01/16/2017 28.8  22.0 - 29.0 mmol/L Final   • Calcium 01/16/2017 9.1  8.8 - 10.5 mg/dL Final   • Total Protein 01/16/2017 6.4  6.0 - 8.5 g/dL Final   • Albumin 01/16/2017 4.10  3.50 - 5.20 g/dL Final   • Globulin 01/16/2017 2.3  gm/dL Final   • A/G Ratio 01/16/2017 1.8  g/dL Final   •  Total Bilirubin 01/16/2017 0.4  0.2 - 1.2 mg/dL Final   • Alkaline Phosphatase 01/16/2017 131* 40 - 129 U/L Final   • AST (SGOT) 01/16/2017 19  5 - 40 U/L Final   • ALT (SGPT) 01/16/2017 22  5 - 41 U/L Final   • Hemoglobin A1C 01/16/2017 6.10* 4.80 - 5.60 % Final   • Total Cholesterol 01/16/2017 160  0 - 200 mg/dL Final   • Triglycerides 01/16/2017 112  0 - 150 mg/dL Final   • HDL Cholesterol 01/16/2017 36* 40 - 60 mg/dL Final   • VLDL Cholesterol 01/16/2017 22.4  8 - 32 mg/dL Final   • LDL Cholesterol  01/16/2017 102* 0 - 100 mg/dL Final   • 25 Hydroxy, Vitamin D 01/16/2017 27.0  ng/mL Final    Comment: Reference Range for Total Vitamin D 25(OH)  Deficiency    <20.0 ng/mL  Insufficiency 21-29 ng/mL  Sufficiency   30-74 ng/mL       The above results have been reviewed and explained to Simba with his understanding.  A copy has been provided to him.    Physical Exam   Constitutional: He is oriented to person, place, and time. He appears well-developed and well-nourished.   HENT:   Head: Normocephalic and atraumatic.   Neck: Trachea normal and phonation normal. Neck supple. Normal carotid pulses present. Carotid bruit is not present. No thyroid mass and no thyromegaly present.   Cardiovascular: Normal rate, regular rhythm and normal heart sounds.  Exam reveals no gallop and no friction rub.    No murmur heard.  Pulmonary/Chest: Effort normal and breath sounds normal. No respiratory distress. He has no decreased breath sounds. He has no wheezes. He has no rhonchi. He has no rales.   Lymphadenopathy:     He has no cervical adenopathy.   Neurological: He is alert and oriented to person, place, and time.   Skin: Skin is warm and dry. No rash noted.   Psychiatric: He has a normal mood and affect. His speech is normal and behavior is normal. Judgment and thought content normal. Cognition and memory are normal.   Nursing note and vitals reviewed.      Assessment/Plan   Simba was seen today for hypertension.    Diagnoses  and all orders for this visit:    HTN (hypertension), benign  -     CBC & AUTO Differential; Future  -     Comprehensive Metabolic Panel; Future  -     Lipid Panel; Future    Vitamin D deficiency  -     CBC & AUTO Differential; Future  -     Comprehensive Metabolic Panel; Future  -     Vitamin D 25 Hydroxy; Future    Controlled type 2 diabetes mellitus without complication, without long-term current use of insulin  -     CBC & AUTO Differential; Future  -     Comprehensive Metabolic Panel; Future  -     Hemoglobin A1c; Future    Hyperlipidemia, unspecified hyperlipidemia type  -     CBC & AUTO Differential; Future  -     Comprehensive Metabolic Panel; Future  -     Lipid Panel; Future    Health care maintenance  -     PSA; Future    Morbid exogenous obesity    Other orders  -     ARIPiprazole (ABILIFY) 20 MG tablet; Take 1 tablet by mouth Daily.  -     buPROPion XL (WELLBUTRIN XL) 300 MG 24 hr tablet; Take 1 tablet by mouth Every Morning.  -     FLUoxetine (PROzac) 40 MG capsule; Take 1 capsule by mouth Daily.  -     metFORMIN (GLUCOPHAGE) 500 MG tablet; Take 1 tablet by mouth 2 (Two) Times a Day With Meals.  -     pantoprazole (PROTONIX) 40 MG EC tablet; Take 1 tablet by mouth Daily.      Patient Instructions   Avoid cholesterol and sugar in diet.  Start to lower weight with restricting calories and exercising.    Return in about 6 months (around 7/23/2017).      Scribed for Jose Maria Molina MD by Ambrocio Doty. 01/23/2017    IJose Maria MD personally performed the services described in this documentation, as scribed by Ambrocio Doty in my presence, and it is both accurate and complete

## 2023-04-23 NOTE — PROGRESS NOTES
Subjective:     Patient ID: Simba Mosher is a 67 y.o. male.    Chief Complaint:  Follow-up right knee pain, DJD  History of Present Illness  Simba Mosher returns to clinic today for evaluation of right knee, states he had moderate improvement with viscous supplement injection series, noticed since the improvement in the joint line pain to his right knee, still does have some residual pain which he rates as a 2-3 out of 10 in aching in nature, primarily localized to the medial aspect of his knee, exacerbated with prolonged weightbearing, mild pain at nights, improved with rest.    He states he is also having some increasing pain that is a little bit different over the anterior aspect of his knee and leg extending down to the anterior ankle which he describes as burning in nature, he denies any previous diagnosis of neuropathy, rates this pain as a 5-6 out of 10, does wax and wane, exacerbated with local pressure, no significant improvement with soft tissue massage.  He is on gabapentin for other issues and is noted no improvement with this.     Social History     Occupational History   • Not on file.     Social History Main Topics   • Smoking status: Former Smoker   • Smokeless tobacco: Never Used   • Alcohol use No   • Drug use: No   • Sexual activity: Defer      Past Medical History:   Diagnosis Date   • Cervical disc disorder    • Diabetes mellitus    • Fracture of wrist    • Hypertension      Past Surgical History:   Procedure Laterality Date   • CERVICAL SPINE SURGERY     • EYE SURGERY Left    • HYDROCELE EXCISION / REPAIR Bilateral    • TONSILLECTOMY         Family History   Problem Relation Age of Onset   • Cancer Mother      melanoma   • Migraines Mother    • Lupus Sister          Review of Systems   Constitutional: Negative for chills, diaphoresis, fever and unexpected weight change.   HENT: Negative for hearing loss, nosebleeds, sore throat and tinnitus.    Eyes: Negative for pain and visual  disturbance.   Respiratory: Negative for cough, shortness of breath and wheezing.    Cardiovascular: Negative for chest pain and palpitations.   Gastrointestinal: Negative for abdominal pain, diarrhea, nausea and vomiting.   Endocrine: Negative for cold intolerance, heat intolerance and polydipsia.   Genitourinary: Negative for difficulty urinating, dysuria and hematuria.   Musculoskeletal: Positive for arthralgias. Negative for joint swelling and myalgias.   Skin: Negative for rash and wound.   Allergic/Immunologic: Negative for environmental allergies.   Neurological: Negative for dizziness, syncope and numbness.   Hematological: Does not bruise/bleed easily.   Psychiatric/Behavioral: Negative for dysphoric mood and sleep disturbance. The patient is not nervous/anxious.    All other systems reviewed and are negative.          Objective:  There were no vitals filed for this visit.  There were no vitals filed for this visit.  There is no height or weight on file to calculate BMI.  General: No acute distress.  Resp: normal respiratory effort  Skin: no rashes or wounds; normal turgor  Psych: mood and affect appropriate; recent and remote memory intact         Ortho Exam    Right knee-active range of motion 2-125°, 4+ out of 5 strength on flexion and extension, minimal effusion noted.  Mild tenderness along medial joint line as well as medial lateral patellar facet, mildly positive active patellar compression test and Dea exam with no click, stable to varus and valgus stress at 2 and 30°.  Moderate pain extending down over the anterior tibia to the level the ankle with sensation to light touch noted, significant changes consistent with chronic lymphedema and venous stasis bilateral lower extremities.     Imaging:    Assessment:       1. Primary osteoarthritis of right knee    2. Neuropathic pain of lower extremity, right          Plan:  JOSE R query complete.          Discussed treatment options at length with  patient at today's visit. Reviewed options with patient today, he has had a moderately successful result with viscous supplement injection series in regards to his pain from his arthritis to the right knee.  Given this new issue with the neuropathic pain that appears to be causing issues to his right leg, we discussed options and I recommended consideration for an EMG and nerve conduction study.  We will follow up on results from this at his follow-up visit.  If there are any issues evident from this then I would recommend follow-up with a neurologist.    Simba Mosher was in agreement with plan and had all questions answered.     Orders:  Orders Placed This Encounter   Procedures   • EMG & Nerve Conduction Test       Medications:  No orders of the defined types were placed in this encounter.      Followup:  Return in about 6 weeks (around 1/9/2018).    Simba was seen today for follow-up and pain.    Diagnoses and all orders for this visit:    Primary osteoarthritis of right knee    Neuropathic pain of lower extremity, right  -     EMG & Nerve Conduction Test; Future        Dragon transcription disclaimer     Much of this encounter note is an electronic transcription/translation of spoken language to printed text. The electronic translation of spoken language may permit erroneous, or at times, nonsensical words or phrases to be inadvertently transcribed. Although I have reviewed the note for such errors, some may still exist.   room air

## 2023-11-17 ENCOUNTER — TRANSCRIBE ORDERS (OUTPATIENT)
Dept: ADMINISTRATIVE | Facility: HOSPITAL | Age: 73
End: 2023-11-17
Payer: MEDICARE

## 2023-11-17 ENCOUNTER — HOSPITAL ENCOUNTER (OUTPATIENT)
Dept: ULTRASOUND IMAGING | Facility: HOSPITAL | Age: 73
Discharge: HOME | End: 2023-11-17
Payer: MEDICARE

## 2023-11-17 DIAGNOSIS — M79.605 PAIN IN BOTH LOWER EXTREMITIES: ICD-10-CM

## 2023-11-17 DIAGNOSIS — M79.604 PAIN IN BOTH LOWER EXTREMITIES: ICD-10-CM

## 2023-11-17 DIAGNOSIS — M79.669 CALF TENDERNESS: ICD-10-CM

## 2023-11-17 DIAGNOSIS — M79.89 LEG SWELLING: ICD-10-CM

## 2023-11-17 DIAGNOSIS — R60.9 EDEMA, UNSPECIFIED TYPE: Primary | ICD-10-CM

## 2023-11-17 PROCEDURE — 93970 EXTREMITY STUDY: CPT

## 2024-04-28 ENCOUNTER — HOSPITAL ENCOUNTER (EMERGENCY)
Facility: HOSPITAL | Age: 74
Discharge: HOME | End: 2024-04-28
Attending: EMERGENCY MEDICINE | Admitting: EMERGENCY MEDICINE
Payer: MEDICARE

## 2024-04-28 VITALS
SYSTOLIC BLOOD PRESSURE: 150 MMHG | BODY MASS INDEX: 31.84 KG/M2 | OXYGEN SATURATION: 92 % | HEART RATE: 94 BPM | TEMPERATURE: 98.3 F | RESPIRATION RATE: 14 BRPM | DIASTOLIC BLOOD PRESSURE: 90 MMHG | HEIGHT: 69 IN | WEIGHT: 215 LBS

## 2024-04-28 DIAGNOSIS — M54.9 CHRONIC MIDLINE BACK PAIN, UNSPECIFIED BACK LOCATION: Primary | ICD-10-CM

## 2024-04-28 DIAGNOSIS — G89.29 CHRONIC MIDLINE BACK PAIN, UNSPECIFIED BACK LOCATION: Primary | ICD-10-CM

## 2024-04-28 PROCEDURE — 99283 EMERGENCY DEPT VISIT LOW MDM: CPT

## 2024-04-28 RX ORDER — HYDROCODONE BITARTRATE AND ACETAMINOPHEN 7.5; 325 MG/1; MG/1
1 TABLET ORAL EVERY 6 HOURS PRN
Status: DISCONTINUED | OUTPATIENT
Start: 2024-04-28 | End: 2024-04-28 | Stop reason: HOSPADM

## 2024-04-28 RX ORDER — HYDROCODONE BITARTRATE AND ACETAMINOPHEN 7.5; 325 MG/1; MG/1
1 TABLET ORAL EVERY 6 HOURS PRN
Qty: 30 TABLET | Refills: 0 | Status: SHIPPED | OUTPATIENT
Start: 2024-04-28

## 2024-04-28 RX ADMIN — HYDROCODONE BITARTRATE AND ACETAMINOPHEN 1 TABLET: 7.5; 325 TABLET ORAL at 17:07

## 2024-04-28 NOTE — DISCHARGE INSTRUCTIONS
Take your hydrocodone as prescribed.  Follow-up with your primary care provider for further refills on your pain medication.  Turn to emergency department if you have worsening pain, numbness, tingling, weakness, fever, chills, change in bladder or bowel function, worse in any way at all.

## 2024-04-28 NOTE — ED NOTES
Pt give discharge instructions. Pt states he forgot his phone at home and does not have any contact information for anyone who might come pick him up. Attempted to contact emergency contact listed in EMR but number is no longer in service. Pt considering transportation options.

## 2024-04-28 NOTE — ED PROVIDER NOTES
"Subjective   History of Present Illness    Chief complaint: Back pain and neck pain    Location: Back and neck    Quality/Severity: Severe    Timing/Onset/Duration: Worse today    Modifying Factors: Hurts to move    Associated Symptoms: No headache.  No fever chills or cough.  No sore throat earache or nasal congestion.  No chest pain or shortness of breath.  No abdominal pain.  No diarrhea or burning when he urinates.  No numbness, tingling, weakness, or change in bladder or bowel function.    Narrative: This 73-year-old presents with neck and back pain for the last month.  Patient states \"I cannot stand the pain on the night\".  Had neck and back surgery 5 years ago.  Patient has been out of his meds for the last month.  Patient requested a refill of his meds that was not filled due to lack of refills available in his been having trouble obtaining a refill.    PCP:Manjeet Crowe MD      Review of Systems   Musculoskeletal:  Positive for back pain and neck pain.   Neurological:  Negative for weakness (No new) and numbness (No new).         Past Medical History:   Diagnosis Date    Cervical disc disorder     Diabetes mellitus     Fracture of wrist     Hypertension        Allergies   Allergen Reactions    Iodine     Shellfish-Derived Products        Past Surgical History:   Procedure Laterality Date    CERVICAL SPINE SURGERY      EYE SURGERY Left     HYDROCELE EXCISION / REPAIR Bilateral     TONSILLECTOMY         Family History   Problem Relation Age of Onset    Cancer Mother         melanoma    Migraines Mother     Lupus Sister        Social History     Socioeconomic History    Marital status:    Tobacco Use    Smoking status: Former    Smokeless tobacco: Never   Substance and Sexual Activity    Alcohol use: No    Drug use: No    Sexual activity: Defer           Objective   Physical Exam  Vitals (The blood pressure is 175/105, temp 98.3, overall pulse 97, respirations 18, room air pulse ox 95%.) and " nursing note reviewed.   Constitutional:       Appearance: Normal appearance.   Musculoskeletal:         General: Tenderness present.      Cervical back: Tenderness present.   Neurological:      General: No focal deficit present.      Mental Status: He is alert and oriented to person, place, and time.      Sensory: No sensory deficit.      Motor: No weakness.         Procedures           ED Course                                             Medical Decision Making  Problems Addressed:  Chronic midline back pain, unspecified back location: complicated acute illness or injury    Risk  Prescription drug management.        Final diagnoses:   Chronic midline back pain, unspecified back location       ED Disposition  ED Disposition       None            No follow-up provider specified.       Medication List         Notice    Cannot display patient medications because the patient has not yet arrived.            Calos Ortiz MD  04/28/24 1013